# Patient Record
Sex: MALE | Race: WHITE | NOT HISPANIC OR LATINO
[De-identification: names, ages, dates, MRNs, and addresses within clinical notes are randomized per-mention and may not be internally consistent; named-entity substitution may affect disease eponyms.]

---

## 2020-05-26 ENCOUNTER — APPOINTMENT (OUTPATIENT)
Dept: ENDOCRINOLOGY | Facility: CLINIC | Age: 55
End: 2020-05-26
Payer: COMMERCIAL

## 2020-05-26 VITALS
DIASTOLIC BLOOD PRESSURE: 82 MMHG | BODY MASS INDEX: 41.75 KG/M2 | HEART RATE: 77 BPM | SYSTOLIC BLOOD PRESSURE: 136 MMHG | OXYGEN SATURATION: 98 % | HEIGHT: 73 IN | RESPIRATION RATE: 22 BRPM | WEIGHT: 315 LBS

## 2020-05-26 DIAGNOSIS — Z78.9 OTHER SPECIFIED HEALTH STATUS: ICD-10-CM

## 2020-05-26 DIAGNOSIS — Z83.49 FAMILY HISTORY OF OTHER ENDOCRINE, NUTRITIONAL AND METABOLIC DISEASES: ICD-10-CM

## 2020-05-26 DIAGNOSIS — Z80.42 FAMILY HISTORY OF MALIGNANT NEOPLASM OF PROSTATE: ICD-10-CM

## 2020-05-26 PROBLEM — Z00.00 ENCOUNTER FOR PREVENTIVE HEALTH EXAMINATION: Status: ACTIVE | Noted: 2020-05-26

## 2020-05-26 PROCEDURE — 99204 OFFICE O/P NEW MOD 45 MIN: CPT

## 2020-05-26 RX ORDER — VALSARTAN 160 MG/1
160 TABLET, COATED ORAL
Qty: 30 | Refills: 0 | Status: ACTIVE | COMMUNITY
Start: 2020-04-16

## 2020-05-26 RX ORDER — ATORVASTATIN CALCIUM 10 MG/1
10 TABLET, FILM COATED ORAL
Qty: 90 | Refills: 0 | Status: ACTIVE | COMMUNITY
Start: 2020-04-16

## 2020-05-26 RX ORDER — HYDROCHLOROTHIAZIDE 25 MG/1
25 TABLET ORAL
Qty: 30 | Refills: 0 | Status: ACTIVE | COMMUNITY
Start: 2020-04-16

## 2020-05-26 NOTE — PHYSICAL EXAM
[Well Nourished] : well nourished [Alert] : alert [No Acute Distress] : no acute distress [Normal Sclera/Conjunctiva] : normal sclera/conjunctiva [Well Developed] : well developed [No Proptosis] : no proptosis [EOMI] : extra ocular movement intact [Thyroid Not Enlarged] : the thyroid was not enlarged [Normal Oropharynx] : the oropharynx was normal [No Thyroid Nodules] : no palpable thyroid nodules [No Respiratory Distress] : no respiratory distress [No Accessory Muscle Use] : no accessory muscle use [Clear to Auscultation] : lungs were clear to auscultation bilaterally [Normal S1, S2] : normal S1 and S2 [Normal Rate] : heart rate was normal [Regular Rhythm] : with a regular rhythm [Pedal Pulses Normal] : the pedal pulses are present [Not Tender] : non-tender [Normal Bowel Sounds] : normal bowel sounds [Not Distended] : not distended [Soft] : abdomen soft [Normal Anterior Cervical Nodes] : no anterior cervical lymphadenopathy [Normal Posterior Cervical Nodes] : no posterior cervical lymphadenopathy [Spine Straight] : spine straight [No Stigmata of Cushings Syndrome] : no stigmata of Cushings Syndrome [Normal Gait] : normal gait [No Rash] : no rash [Normal Strength/Tone] : muscle strength and tone were normal [Normal Reflexes] : deep tendon reflexes were 2+ and symmetric [No Tremors] : no tremors [Oriented x3] : oriented to person, place, and time [Acanthosis Nigricans] : no acanthosis nigricans [de-identified] : pitting edema B/L

## 2020-05-26 NOTE — ASSESSMENT
[Carbohydrate Consistent Diet] : carbohydrate consistent diet [Importance of Diet and Exercise] : importance of diet and exercise to improve glycemic control, achieve weight loss and improve cardiovascular health [FreeTextEntry1] : Hyperglycemia: check A1c \par \par Erectile dysfunction / testicular hypogonadism \par check testosterone , FSH . LH, SHBG \par check prolactin \par \par HTN :  bp at target on meds. Continue current management.\par I advised low fat/low cholesterol diet, low salt diet, and weight loss\par \par HLD: LDL at target. continue statin \par low fat/low cholesterol diet and weight loss advised\par \par \par Abnormal TFts  check TFts and TPO ab and Tg ab \par he has a brother with hypothyroidism \par \par Morbid obesity : extensive discussion about diet , he cooks at home. Advised to cut down on carbs , start diet 1200 ericka/day. Discussed implications of obesity and testo production and KEILY. \par he has KEILY and si treated with CPAP. \par discussed bariatric surgery but he " wants to eat" and does not want to do it \par discussed weight loss medication and eh will check with insurance. \par

## 2020-09-28 ENCOUNTER — APPOINTMENT (OUTPATIENT)
Dept: ENDOCRINOLOGY | Facility: CLINIC | Age: 55
End: 2020-09-28
Payer: COMMERCIAL

## 2020-09-28 VITALS
BODY MASS INDEX: 41.75 KG/M2 | SYSTOLIC BLOOD PRESSURE: 120 MMHG | WEIGHT: 315 LBS | DIASTOLIC BLOOD PRESSURE: 74 MMHG | HEIGHT: 73 IN

## 2020-09-28 VITALS — TEMPERATURE: 97.1 F

## 2020-09-28 DIAGNOSIS — E66.01 MORBID (SEVERE) OBESITY DUE TO EXCESS CALORIES: ICD-10-CM

## 2020-09-28 DIAGNOSIS — E78.5 HYPERLIPIDEMIA, UNSPECIFIED: ICD-10-CM

## 2020-09-28 DIAGNOSIS — I10 ESSENTIAL (PRIMARY) HYPERTENSION: ICD-10-CM

## 2020-09-28 PROCEDURE — 99214 OFFICE O/P EST MOD 30 MIN: CPT

## 2020-09-28 RX ORDER — TOBRAMYCIN AND DEXAMETHASONE 3; 1 MG/ML; MG/ML
0.3-0.1 SUSPENSION/ DROPS OPHTHALMIC
Qty: 5 | Refills: 0 | Status: DISCONTINUED | COMMUNITY
Start: 2020-02-18 | End: 2020-09-28

## 2020-09-28 RX ORDER — SODIUM SULFATE, POTASSIUM SULFATE, MAGNESIUM SULFATE 17.5; 3.13; 1.6 G/ML; G/ML; G/ML
17.5-3.13-1.6 SOLUTION, CONCENTRATE ORAL
Qty: 354 | Refills: 0 | Status: DISCONTINUED | COMMUNITY
Start: 2020-05-04 | End: 2020-09-28

## 2020-09-28 NOTE — REASON FOR VISIT
[Follow - Up] : a follow-up visit [Weight Management/Obesity] : weight management/obesity [Hypogonadism] : hypogonadism

## 2020-09-28 NOTE — ASSESSMENT
[FreeTextEntry1] : Prediabetes : a1c 5.8. \par discussed diet and exercise\par he needs to loose weight. \par encouraged more exercise walking 30 min 3 x week\par Discussed complications of diabetes at length including MI/CVA/ESRD/dialysis/blindness/amputations/infections\par Needs to try to have more protein for meals\par Importance of blood glucose monitoring discussed. Targets reviewed. Recommended pt try to keep blood glucose level below 130 (110) before meals and below 160 (140) two hours after meals.\par \par Erectile dysfunction / testicular hypogonadism : testosterone low normal. T 282. \par check prolactin \par \par HTN :  bp at target on meds. Continue current management.\par I advised low fat/low cholesterol diet, low salt diet, and weight loss\par \par HLD: continue statin \par low fat/low cholesterol diet and weight loss advised\par \par Abnormal TFts : pt euthyroid clinically and biochemically.\par TPO ab negative. \par \par Morbid obesity : start diet 1200 ericka/day. \par he has KEILY and si treated with CPAP. \par declines bariatric surgery \par discussed weight loss medication and he was supposed to  check with insurance. But he did not.\par  [Importance of Diet and Exercise] : importance of diet and exercise to improve glycemic control, achieve weight loss and improve cardiovascular health [Exercise/Effect on Glucose] : exercise/effect on glucose [Weight Loss] : weight loss

## 2020-09-28 NOTE — PHYSICAL EXAM
[Alert] : alert [Well Nourished] : well nourished [No Acute Distress] : no acute distress [Well Developed] : well developed [Normal Sclera/Conjunctiva] : normal sclera/conjunctiva [EOMI] : extra ocular movement intact [No Proptosis] : no proptosis [Normal Oropharynx] : the oropharynx was normal [Thyroid Not Enlarged] : the thyroid was not enlarged [No Thyroid Nodules] : no palpable thyroid nodules [No Respiratory Distress] : no respiratory distress [No Accessory Muscle Use] : no accessory muscle use [Clear to Auscultation] : lungs were clear to auscultation bilaterally [Normal S1, S2] : normal S1 and S2 [Normal Rate] : heart rate was normal [Regular Rhythm] : with a regular rhythm [No Edema] : no peripheral edema [Pedal Pulses Normal] : the pedal pulses are present [Normal Bowel Sounds] : normal bowel sounds [Not Tender] : non-tender [Not Distended] : not distended [Soft] : abdomen soft [Normal Anterior Cervical Nodes] : no anterior cervical lymphadenopathy [Normal Posterior Cervical Nodes] : no posterior cervical lymphadenopathy [Spine Straight] : spine straight [Normal Gait] : normal gait [No Rash] : no rash [No Tremors] : no tremors [Oriented x3] : oriented to person, place, and time [Acanthosis Nigricans] : no acanthosis nigricans

## 2021-02-10 ENCOUNTER — APPOINTMENT (OUTPATIENT)
Dept: ENDOCRINOLOGY | Facility: CLINIC | Age: 56
End: 2021-02-10

## 2021-10-06 PROBLEM — I10 ESSENTIAL HYPERTENSION: Status: ACTIVE | Noted: 2020-05-26

## 2023-03-15 ENCOUNTER — APPOINTMENT (OUTPATIENT)
Dept: RHEUMATOLOGY | Facility: CLINIC | Age: 58
End: 2023-03-15
Payer: COMMERCIAL

## 2023-03-15 ENCOUNTER — NON-APPOINTMENT (OUTPATIENT)
Age: 58
End: 2023-03-15

## 2023-03-15 VITALS
DIASTOLIC BLOOD PRESSURE: 70 MMHG | BODY MASS INDEX: 41.75 KG/M2 | HEIGHT: 73 IN | WEIGHT: 315 LBS | TEMPERATURE: 97.6 F | SYSTOLIC BLOOD PRESSURE: 150 MMHG | HEART RATE: 81 BPM | OXYGEN SATURATION: 97 %

## 2023-03-15 DIAGNOSIS — E88.81 METABOLIC SYNDROME: ICD-10-CM

## 2023-03-15 PROCEDURE — 99204 OFFICE O/P NEW MOD 45 MIN: CPT | Mod: 25

## 2023-03-15 NOTE — ASSESSMENT
[FreeTextEntry1] : 56 y/o male referred to rheumatology for severe gout.\par Pt started to have gout attacks x 2 in R 1st toe since 2022. Pt also started to have significant chronic bottom of b/l feet pain since then. Pt was started on Mitigare 0.6mg daily in 11/2022 without improvement in the gout pains. Pt was treated with prednisone during prior flares.\par Pt reports drinking EtOH as a trigger. Denies major red meat or shellfish consumption.\par \par Patient has episodes of acute gout and possible chronic gout of b/l feet with high uric acid (8.5 in 1/2023) likely secondary to metabolic syndrome (HTN, HLD, obesity, fatty liver) and diet (EtOH).\par Pt needs long term ULT to prevent gout attacks and decrease cardiovascular risk.\par \par - Obtain labwork to evaluate gout\par - Obtain XR b/l feet to evaluate for gouty changes\par - Rx allopurinol 100mg -> 300mg/day. Side effects of allopurinol were discussed with the pt in detail including risk of acute gout flare on initiation, nausea, diarrhea, rash, abnormal LFTs.\par Dose adjust if GFR<=30. Avoid with AZA or 6-MP.\par - c/w mitigare 0.6mg PO daily for now. Will discontinue when pt's uric acid is well controlled\par - Pt can call for gout flares, short courses of NSAIDs vs. PDN for acute flares\par - Pt's HCTZ can increase uric acid, advised to discuss with PCP about possible switch to a different BP med if possible. Consider use of amlodipine, losartan if indicated as they have moderate urate lowering effects.\par - Advised to avoid excessive alcohol, shellfish, red meat, fructose consumption which can increase serum urate levels and trigger gout attacks. Advised on weight loss. Advised on increased consumption of low-fat dairy products, tart cherry juice which can decrease serum urate levels. Close evaluation for and treatment of hypertension, dyslipidemia, coronary artery disease, diabetes, as gout is associated with metabolic syndrome.\par - RTC 2 months for follow up\par \par \par

## 2023-03-15 NOTE — HISTORY OF PRESENT ILLNESS
[FreeTextEntry1] : 58 y/o male referred to rheumatology for severe gout.\par Pt started to have gout attacks x 2 in R 1st toe since 2022. Pt also started to have significant chronic bottom of b/l feet pain since then. Pt was started on Mitigare 0.6mg daily in 11/2022 without improvement in the gout pains. Pt was treated with prednisone during prior flares.\par Pt reports drinking EtOH as a trigger. Denies major red meat or shellfish consumption.\par

## 2023-03-16 LAB
ALBUMIN SERPL ELPH-MCNC: 4.5 G/DL
ALP BLD-CCNC: 104 U/L
ALT SERPL-CCNC: 51 U/L
ANION GAP SERPL CALC-SCNC: 15 MMOL/L
AST SERPL-CCNC: 34 U/L
BASOPHILS # BLD AUTO: 0.03 K/UL
BASOPHILS NFR BLD AUTO: 0.3 %
BILIRUB SERPL-MCNC: 0.5 MG/DL
BUN SERPL-MCNC: 11 MG/DL
CALCIUM SERPL-MCNC: 9.6 MG/DL
CHLORIDE SERPL-SCNC: 100 MMOL/L
CO2 SERPL-SCNC: 26 MMOL/L
CREAT SERPL-MCNC: 0.83 MG/DL
CRP SERPL-MCNC: 7 MG/L
EGFR: 101 ML/MIN/1.73M2
EOSINOPHIL # BLD AUTO: 0.28 K/UL
EOSINOPHIL NFR BLD AUTO: 2.9 %
ERYTHROCYTE [SEDIMENTATION RATE] IN BLOOD BY WESTERGREN METHOD: 46 MM/HR
GLUCOSE SERPL-MCNC: 115 MG/DL
HCT VFR BLD CALC: 45.5 %
HGB BLD-MCNC: 15.2 G/DL
IMM GRANULOCYTES NFR BLD AUTO: 0.2 %
LYMPHOCYTES # BLD AUTO: 3.18 K/UL
LYMPHOCYTES NFR BLD AUTO: 33.4 %
MAN DIFF?: NORMAL
MCHC RBC-ENTMCNC: 31.9 PG
MCHC RBC-ENTMCNC: 33.4 GM/DL
MCV RBC AUTO: 95.4 FL
MONOCYTES # BLD AUTO: 0.81 K/UL
MONOCYTES NFR BLD AUTO: 8.5 %
NEUTROPHILS # BLD AUTO: 5.2 K/UL
NEUTROPHILS NFR BLD AUTO: 54.7 %
PLATELET # BLD AUTO: 329 K/UL
POTASSIUM SERPL-SCNC: 4.9 MMOL/L
PROT SERPL-MCNC: 7.6 G/DL
RBC # BLD: 4.77 M/UL
RBC # FLD: 13.7 %
SODIUM SERPL-SCNC: 140 MMOL/L
URATE SERPL-MCNC: 7 MG/DL
WBC # FLD AUTO: 9.52 K/UL

## 2023-03-19 LAB — G6PD SER-CCNC: 14.3 U/G HGB

## 2023-04-19 ENCOUNTER — RESULT REVIEW (OUTPATIENT)
Age: 58
End: 2023-04-19

## 2023-04-19 ENCOUNTER — APPOINTMENT (OUTPATIENT)
Dept: RADIOLOGY | Facility: CLINIC | Age: 58
End: 2023-04-19
Payer: COMMERCIAL

## 2023-04-19 ENCOUNTER — OUTPATIENT (OUTPATIENT)
Dept: OUTPATIENT SERVICES | Facility: HOSPITAL | Age: 58
LOS: 1 days | End: 2023-04-19
Payer: COMMERCIAL

## 2023-04-19 DIAGNOSIS — M10.9 GOUT, UNSPECIFIED: ICD-10-CM

## 2023-04-19 PROCEDURE — 73630 X-RAY EXAM OF FOOT: CPT

## 2023-04-19 PROCEDURE — 73630 X-RAY EXAM OF FOOT: CPT | Mod: 26,50

## 2023-04-20 ENCOUNTER — APPOINTMENT (OUTPATIENT)
Dept: ORTHOPEDIC SURGERY | Facility: CLINIC | Age: 58
End: 2023-04-20
Payer: COMMERCIAL

## 2023-04-20 VITALS — BODY MASS INDEX: 41.75 KG/M2 | WEIGHT: 315 LBS | HEIGHT: 73 IN

## 2023-04-20 DIAGNOSIS — I10 ESSENTIAL (PRIMARY) HYPERTENSION: ICD-10-CM

## 2023-04-20 PROCEDURE — 73502 X-RAY EXAM HIP UNI 2-3 VIEWS: CPT

## 2023-04-20 PROCEDURE — 99203 OFFICE O/P NEW LOW 30 MIN: CPT

## 2023-04-20 NOTE — HISTORY OF PRESENT ILLNESS
[Gradual] : gradual [10] : 10 [0] : 0 [Localized] : localized [Sharp] : sharp [Tingling] : tingling [Rest] : rest [de-identified] : 57M p/w L hip pain. Started after a basketball injury 15 years ago but pain has worsened steadily. He complains of anterior hip and groin pain mostly. He has tried ibuprofen without much relief. He has not tried PT yet. [] : no [FreeTextEntry1] : Left Hip  [FreeTextEntry3] : 15 years ago  [FreeTextEntry5] : pt states no specific injury has occurred  [de-identified] : activity

## 2023-04-20 NOTE — ASSESSMENT
[FreeTextEntry1] : 57M p/w adv L hip OA\par \par Discussed AZALIA and its recovery, current BMI is 46, discussed posterior approach vs anterior, he would like to try to lose weight for anterior surgery, return 2-3 mo

## 2023-04-20 NOTE — PHYSICAL EXAM
[2+] : dorsalis pedis pulse: 2+ [Left] : left hip with pelvis [Severe arthritis (Tonnis Grade 3)] : Severe arthritis (Tonnis Grade 3) [] : no greater trochanteric tenderness

## 2023-05-17 ENCOUNTER — APPOINTMENT (OUTPATIENT)
Dept: RHEUMATOLOGY | Facility: CLINIC | Age: 58
End: 2023-05-17
Payer: COMMERCIAL

## 2023-05-17 PROCEDURE — 99214 OFFICE O/P EST MOD 30 MIN: CPT | Mod: 25

## 2023-05-17 PROCEDURE — 36415 COLL VENOUS BLD VENIPUNCTURE: CPT

## 2023-05-17 NOTE — ASSESSMENT
[FreeTextEntry1] : 56 y/o male presents as follow up for severe gout. \par Pt started to have gout attacks x 2 in R 1st toe since 2022. Pt also started to have significant chronic bottom of b/l feet pain since then. Pt was started on Mitigare 0.6mg daily in 11/2022 without improvement in the gout pains. Pt was treated with prednisone during prior flares. \par Pt reports drinking EtOH as a trigger. Denies major red meat or shellfish consumption. \par \par Patient has episodes of acute gout and possible chronic gout of b/l feet with high uric acid (8.5 in 1/2023) likely secondary to metabolic syndrome (HTN, HLD, obesity, fatty liver) and diet (EtOH). \par Pt needs long term ULT to prevent gout attacks and decrease cardiovascular risk. \par \par Pt was started on allopurinol with titration up without any changes in his chronic feet pain. Pt has stopped Mitigare since last visit with no increase in his feet pain. Pt should continued to be treated for gout while evaluating for other causes of feet pain.\par \par - Obtain labwork to evaluate gout \par - Obtain MR L foot with contrast to evaluate for causes of chronic severe feet pain not improved with gout treatment. \par - c/w allopurinol 300mg/day. Side effects of allopurinol were discussed with the pt in detail including risk of acute gout flare on initiation, nausea, diarrhea, rash, abnormal LFTs. \par Dose adjust if GFR<=30. Avoid with AZA or 6-MP. \par - Pt can call for gout flares, short courses of NSAIDs vs. PDN for acute flares \par - Pt's HCTZ can increase uric acid, advised to discuss with PCP about possible switch to a different BP med if possible. Consider use of amlodipine, losartan if indicated as they have moderate urate lowering effects. \par - Advised to avoid excessive alcohol, shellfish, red meat, fructose consumption which can increase serum urate levels and trigger gout attacks. Advised on weight loss. Advised on increased consumption of low-fat dairy products, tart cherry juice which can decrease serum urate levels. Close evaluation for and treatment of hypertension, dyslipidemia, coronary artery disease, diabetes, as gout is associated with metabolic syndrome. \par - Will call pt with uric acid results and MR results for any changes in therapy. RTC 3 months for follow up \par

## 2023-05-17 NOTE — HISTORY OF PRESENT ILLNESS
[FreeTextEntry1] : HISTORY: \par 56 y/o male presents as follow up for severe gout. \par Pt started to have gout attacks x 2 in R 1st toe since 2022. Pt also started to have significant chronic bottom of b/l feet pain since then. Pt was started on Mitigare 0.6mg daily in 11/2022 without improvement in the gout pains. Pt was treated with prednisone during prior flares. \par Pt reports drinking EtOH as a trigger. Denies major red meat or shellfish consumption. \par \par Patient has episodes of acute gout and possible chronic gout of b/l feet with high uric acid (8.5 in 1/2023) likely secondary to metabolic syndrome (HTN, HLD, obesity, fatty liver) and diet (EtOH). \par Pt needs long term ULT to prevent gout attacks and decrease cardiovascular risk. \par \par INTERVAL HISTORY: \par Since last visit, pt's allopurinol was increased to 300mg/day. Pt reports no differences in pain of feet.\par Pt has stopped Mitigare since last visit with no increase in his feet pain.\par \par WORKUP: \par Uric acid: 7.0 (3/2023) \par Remarkable for (3/2023): ESR 46, CRP 7, ALT 51 \par Normal/neg (3/2023): CBC, CMP (except ALT), G6PD level \par XR b/l feet (3/2023): No gouty erosions seen. Minimal plantar calcaneal enthesophyte.

## 2023-05-18 LAB
ALBUMIN SERPL ELPH-MCNC: 4.5 G/DL
ALP BLD-CCNC: 94 U/L
ALT SERPL-CCNC: 34 U/L
ANION GAP SERPL CALC-SCNC: 17 MMOL/L
AST SERPL-CCNC: 29 U/L
BASOPHILS # BLD AUTO: 0.03 K/UL
BASOPHILS NFR BLD AUTO: 0.3 %
BILIRUB SERPL-MCNC: 0.6 MG/DL
BUN SERPL-MCNC: 19 MG/DL
CALCIUM SERPL-MCNC: 10.4 MG/DL
CHLORIDE SERPL-SCNC: 97 MMOL/L
CO2 SERPL-SCNC: 24 MMOL/L
CREAT SERPL-MCNC: 0.86 MG/DL
CRP SERPL-MCNC: 17 MG/L
EGFR: 100 ML/MIN/1.73M2
EOSINOPHIL # BLD AUTO: 0.27 K/UL
EOSINOPHIL NFR BLD AUTO: 3 %
ERYTHROCYTE [SEDIMENTATION RATE] IN BLOOD BY WESTERGREN METHOD: 38 MM/HR
GLUCOSE SERPL-MCNC: 124 MG/DL
HCT VFR BLD CALC: 44.3 %
HGB BLD-MCNC: 14.7 G/DL
IMM GRANULOCYTES NFR BLD AUTO: 0.3 %
LYMPHOCYTES # BLD AUTO: 2.43 K/UL
LYMPHOCYTES NFR BLD AUTO: 27.1 %
MAN DIFF?: NORMAL
MCHC RBC-ENTMCNC: 32.4 PG
MCHC RBC-ENTMCNC: 33.2 GM/DL
MCV RBC AUTO: 97.6 FL
MONOCYTES # BLD AUTO: 0.79 K/UL
MONOCYTES NFR BLD AUTO: 8.8 %
NEUTROPHILS # BLD AUTO: 5.42 K/UL
NEUTROPHILS NFR BLD AUTO: 60.5 %
PLATELET # BLD AUTO: 303 K/UL
POTASSIUM SERPL-SCNC: 4.3 MMOL/L
PROT SERPL-MCNC: 7.3 G/DL
RBC # BLD: 4.54 M/UL
RBC # FLD: 14.5 %
SODIUM SERPL-SCNC: 138 MMOL/L
URATE SERPL-MCNC: 6.1 MG/DL
WBC # FLD AUTO: 8.97 K/UL

## 2023-05-30 ENCOUNTER — APPOINTMENT (OUTPATIENT)
Dept: MRI IMAGING | Facility: CLINIC | Age: 58
End: 2023-05-30
Payer: COMMERCIAL

## 2023-05-30 ENCOUNTER — OUTPATIENT (OUTPATIENT)
Dept: OUTPATIENT SERVICES | Facility: HOSPITAL | Age: 58
LOS: 1 days | End: 2023-05-30

## 2023-05-30 DIAGNOSIS — M10.9 GOUT, UNSPECIFIED: ICD-10-CM

## 2023-05-30 PROCEDURE — 73720 MRI LWR EXTREMITY W/O&W/DYE: CPT | Mod: 26

## 2023-05-31 DIAGNOSIS — M79.672 PAIN IN RIGHT FOOT: ICD-10-CM

## 2023-05-31 DIAGNOSIS — G89.29 PAIN IN RIGHT FOOT: ICD-10-CM

## 2023-05-31 DIAGNOSIS — M79.671 PAIN IN RIGHT FOOT: ICD-10-CM

## 2023-08-14 ENCOUNTER — APPOINTMENT (OUTPATIENT)
Dept: RHEUMATOLOGY | Facility: CLINIC | Age: 58
End: 2023-08-14
Payer: COMMERCIAL

## 2023-08-14 VITALS
HEIGHT: 73 IN | HEART RATE: 78 BPM | WEIGHT: 315 LBS | BODY MASS INDEX: 41.75 KG/M2 | OXYGEN SATURATION: 97 % | SYSTOLIC BLOOD PRESSURE: 169 MMHG | TEMPERATURE: 97.5 F | DIASTOLIC BLOOD PRESSURE: 104 MMHG

## 2023-08-14 DIAGNOSIS — M16.12 UNILATERAL PRIMARY OSTEOARTHRITIS, LEFT HIP: ICD-10-CM

## 2023-08-14 DIAGNOSIS — Z79.899 OTHER LONG TERM (CURRENT) DRUG THERAPY: ICD-10-CM

## 2023-08-14 DIAGNOSIS — M10.9 GOUT, UNSPECIFIED: ICD-10-CM

## 2023-08-14 PROCEDURE — 99214 OFFICE O/P EST MOD 30 MIN: CPT | Mod: 25

## 2023-08-14 PROCEDURE — 36415 COLL VENOUS BLD VENIPUNCTURE: CPT

## 2023-08-14 RX ORDER — ALLOPURINOL 100 MG/1
100 TABLET ORAL
Qty: 90 | Refills: 1 | Status: ACTIVE | COMMUNITY
Start: 2023-03-15 | End: 1900-01-01

## 2023-08-15 LAB
ALBUMIN SERPL ELPH-MCNC: 4.9 G/DL
ALP BLD-CCNC: 107 U/L
ALT SERPL-CCNC: 53 U/L
ANION GAP SERPL CALC-SCNC: 17 MMOL/L
AST SERPL-CCNC: 41 U/L
BILIRUB SERPL-MCNC: 0.4 MG/DL
BUN SERPL-MCNC: 9 MG/DL
CALCIUM SERPL-MCNC: 10.1 MG/DL
CHLORIDE SERPL-SCNC: 90 MMOL/L
CO2 SERPL-SCNC: 24 MMOL/L
CREAT SERPL-MCNC: 0.82 MG/DL
EGFR: 102 ML/MIN/1.73M2
GLUCOSE SERPL-MCNC: 118 MG/DL
POTASSIUM SERPL-SCNC: 4.7 MMOL/L
PROT SERPL-MCNC: 7.7 G/DL
SODIUM SERPL-SCNC: 131 MMOL/L
URATE SERPL-MCNC: 4.2 MG/DL

## 2023-08-15 NOTE — PHYSICAL EXAM
[TextEntry] : GENERAL: Appears in no acute distress HEENT: EOMI, PERRLA. No conjunctival erythema. Moist mucous membranes. No nasopharyngeal ulcers NECK: Supple, no cervical lymphadenopathy, no thyromegaly CARDIOVASCULAR: RRR. S1, S2 auscultated. No murmurs or rubs. PULMONARY: Clear to auscultation b/l, no wheezes, rales, or crackles ABDOMINAL: Soft, nontender, nondistended. Bowel sounds present. No organomegaly. MSK: No active synovitis, swelling, erythema, or warmth. No deformities. Normal ROM of neck, back, b/l upper and lower extremities. SKIN: No lesions or rashes. No tophi visible or palpable NEURO: No focal deficits. PSYCH: AAOx3. Normal affect and thought process.

## 2023-08-15 NOTE — ASSESSMENT
[FreeTextEntry1] : 58 y/o male presents as follow up for gout and feet pain.  Pt started to have gout attacks x 2 in R 1st toe since 2022. Pt also started to have significant chronic bottom of b/l feet pain since then. Pt was started on Mitigare 0.6mg daily in 11/2022 without improvement in the gout pains. Pt was treated with prednisone during prior flares.  Pt reports drinking EtOH as a trigger. Denies major red meat or shellfish consumption.   Patient has episodes of acute gout and possible chronic gout of b/l feet with high uric acid (8.5 in 1/2023) likely secondary to metabolic syndrome (HTN, HLD, obesity, fatty liver) and diet (EtOH).  Pt needs long term ULT to prevent gout attacks and decrease cardiovascular risk. Pt was started on allopurinol with titration up with some improvement in his chronic feet pain. Pt has stopped Mitigare with no increase in his feet pain. MR L foot by me showed focus of edema/enhancement of intrinsic muscle of foot dorsal to 3rd metatarsal. Pt has not been seen by podiatry yet.  Pt has been having worsening L hip OA and is planning on surgery after some weight loss. Pt reports b/l ankle swelling that has been more prominent as well.  - Obtain labwork to evaluate gout  - Refer to podiatry for evaluation of MR foot finding for other causes of pt's feet pains. - c/w allopurinol 400mg/day. Side effects of allopurinol were discussed with the pt in detail including risk of acute gout flare on initiation, nausea, diarrhea, rash, abnormal LFTs.  Dose adjust if GFR<=30. Avoid with AZA or 6-MP.  - Pt can call for gout flares, short courses of NSAIDs vs. PDN for acute flares  - Advised to avoid excessive alcohol, shellfish, red meat, fructose consumption which can increase serum urate levels and trigger gout attacks. Advised on weight loss. Advised on increased consumption of low-fat dairy products, tart cherry juice which can decrease serum urate levels. Close evaluation for and treatment of hypertension, dyslipidemia, coronary artery disease, diabetes, as gout is associated with metabolic syndrome.  - Will call pt with uric acid results for any changes in therapy. RTC 3 months for follow up   ADDENDUM: Uric acid is 4.2, no change in allopurinol dosing is needed at this time.

## 2023-08-15 NOTE — HISTORY OF PRESENT ILLNESS
[FreeTextEntry1] : HISTORY:  58 y/o male presents as follow up for gout and feet pain.  Pt started to have gout attacks x 2 in R 1st toe since 2022. Pt also started to have significant chronic bottom of b/l feet pain since then. Pt was started on Mitigare 0.6mg daily in 11/2022 without improvement in the gout pains. Pt was treated with prednisone during prior flares.  Pt reports drinking EtOH as a trigger. Denies major red meat or shellfish consumption.   Patient has episodes of acute gout and possible chronic gout of b/l feet with high uric acid (8.5 in 1/2023) likely secondary to metabolic syndrome (HTN, HLD, obesity, fatty liver) and diet (EtOH).  Pt needs long term ULT to prevent gout attacks and decrease cardiovascular risk. Pt was started on allopurinol with titration up without any changes in his chronic feet pain. Pt has stopped Mitigarewith no increase in his feet pain. Pt should continued to be treated for gout while evaluating for other causes of feet pain.   INTERVAL HISTORY:  Pt has been on allopurinol 400mg/day with some improvement in his feet pain. Uric acid is now down to 6.1 (on allopurinol 300mg/day dosing) MR L foot by me showed focus of edema/enhancement of intrinsic muscle of foot dorsal to 3rd metatarsal. Pt has not been seen by podiatry yet. Pt has been having worsening L hip OA and is planning on surgery after some weight loss. Pt reports b/l ankle swelling that has been more prominent as well.  WORKUP:  Uric acid: 7.0 (3/2023) -> 6.1 (5/2023)  Remarkable for (3/2023): ESR 46, CRP 7, ALT 51  Normal/neg (3/2023): CBC, CMP (except ALT), G6PD level  XR b/l feet (3/2023): No gouty erosions seen. Minimal plantar calcaneal enthesophyte.  MR L foot (5/2023): No signs of tophaceous gout or erosions. Edema and enhancement around lobulated focus of intrinsic muscle of foot dorsal to the 3rd metatarsal.

## 2023-11-17 ENCOUNTER — APPOINTMENT (OUTPATIENT)
Dept: RHEUMATOLOGY | Facility: CLINIC | Age: 58
End: 2023-11-17

## 2024-03-16 ENCOUNTER — APPOINTMENT (OUTPATIENT)
Dept: ORTHOPEDIC SURGERY | Facility: CLINIC | Age: 59
End: 2024-03-16
Payer: COMMERCIAL

## 2024-03-16 ENCOUNTER — NON-APPOINTMENT (OUTPATIENT)
Age: 59
End: 2024-03-16

## 2024-03-16 VITALS
HEART RATE: 96 BPM | SYSTOLIC BLOOD PRESSURE: 141 MMHG | HEIGHT: 73 IN | DIASTOLIC BLOOD PRESSURE: 83 MMHG | BODY MASS INDEX: 41.75 KG/M2 | WEIGHT: 315 LBS

## 2024-03-16 PROCEDURE — 73502 X-RAY EXAM HIP UNI 2-3 VIEWS: CPT

## 2024-03-16 PROCEDURE — 99204 OFFICE O/P NEW MOD 45 MIN: CPT

## 2024-03-16 NOTE — DISCUSSION/SUMMARY
[Medication Risks Reviewed] : Medication risks reviewed [Surgical risks reviewed] : Surgical risks reviewed [de-identified] : Left hip osteoarthritis Left hip pain  Treatment options were reviewed with the patient is end-stage arthritis of his left hip failure of conservative treatment risks benefits alternatives to left anterior total hip arthroplasty reviewed the patient as well as the preoperative operative and postoperative course.  Patient will require cardiology vascular and medical optimization prior to surgery.  I have  seen and evaluated this patient and is guiding this patients entire orthopedic management plan. The patient was advised that based upon their clinical presentation and radiographic findings they meet inclusion criteria for benefitting from an Anterior approach(ASI) total hip arthroplasty as a treatment option for severe arthritis of their Left hip. The spectrum of treatments for severe hip DJD were reviewed in detail. I reviewed in detail the goals, alternatives, risks and benefits of ASI total hip arthroplasty.  The patient was advised of the possible complications which are inclusive of but not exclusive to VTE-related, infectious-related, anesthetic-related, surgically-related, Lateral Femoral Cutaneous nerve injury-related, medically-related, and implant-related. The patient was advised that limb length equality may not be assured secondary to variabilities in pelvic malrotation, stable intraoperative fit, opposite side wear, and other anatomic deformities of the lower extremity. The patient was advised of the goals, alternatives, risks and benefits of autologous, directed and banked blood product transfusions for perioperative blood losses. A non-cemented type hip implant is utilized in consideration of bony integrity intraoperatively.  The patient was educated regarding the surgical procedure steps, especially using an Hip implant and bone model, as well as steps in their hospital course and primary discharge planning for home discharge with home care and home PT. Choices for implant 's, mainly DJO and Biomet-Connie were reviewed, with selection to be made by surgeon intraoperatively. The patient was advised of the goals, alternatives, risks and benefits of a 3 week course of Celebrex 200 mg BID utilized by Dr. Bauer in their practice to prevent Heterotopic Ossification seen in patients post total hip arthroplasty. If this is medically contraindicated the alternative would be a single dose (800cGy) radiation treatment to the hip implant site performed pre-operatively. A consultation with the Radiation Oncologist at [facility] will then be required. I reviewed the plan of care as well as a model of the Hip implant equivalent to the one that will be used for the THR. The patient agreed to the plan of care as well as the use of implants for their THR. The patient was advised that they will require a medical preoperative risk evaluation by their PCP. Further medical subspecialty clearances such as cardiac may be indicated if felt needed by their PCP. The patient was advised he/she may call our office after careful consideration of their treatment options and further consultation The patient was thoroughly educated using models and the actual implant.  All of the patient's questions were answered to their satisfaction.  The patient would need a bedside commode and a rolling walker for use for activities of daily living status post a total joint replacement .

## 2024-03-16 NOTE — HISTORY OF PRESENT ILLNESS
[de-identified] : 58-year-old male presents complaining of chronic left hip and groin pain.  He notes pain in the groin difficulty ambulating secondary to his pain.  He has been walking with a limp.  He notes his pain is worse with ascending descending stairs bending and squatting to maneuvers long periods of ambulation.  No improvement with anti-inflammatory medication and conservative measures.  He is a retired  and 5 years ago they thought it was his back he underwent back surgery with no improvement and continued to have groin pain radiating down his thigh into his knee.  Recently he was hospitalized for cellulitis in the right lower extremity he did see a vascular doctor who found him to have normal blood flow to his lower extremities.  He denies paresthesias in the lower extremity otherwise has no other orthopedic complaints.  He is otherwise in his normal state of health.

## 2024-03-16 NOTE — PHYSICAL EXAM
[Antalgic] : antalgic [LE] : Sensory: Intact in bilateral lower extremities [Normal] : no peripheral adenopathy appreciated [de-identified] : Examination of the left hip: Inspection: Leg lengths were measured with lying supine on the exam table noted to be symmetric.  The patient was evaluated ambulating and noted to have a Trendelenburg gait. Palpation no significant tenderness to palpation Range of motion: Significant decreased range of motion of the left hip when compared to the right side hip flexion 0-70 internal rotation 0 degrees external rotation 0 to 20 degrees.  This is significant pain noted with range of motion.  [de-identified] : X-rays of the left hip and pelvis show severe degenerative changes of the left hip with bone-on-bone contact periarticular osteophytes and cystic changes.

## 2024-03-18 ENCOUNTER — OUTPATIENT (OUTPATIENT)
Dept: OUTPATIENT SERVICES | Facility: HOSPITAL | Age: 59
LOS: 1 days | End: 2024-03-18
Payer: COMMERCIAL

## 2024-03-18 VITALS
RESPIRATION RATE: 15 BRPM | TEMPERATURE: 98 F | OXYGEN SATURATION: 96 % | HEIGHT: 74 IN | DIASTOLIC BLOOD PRESSURE: 90 MMHG | HEART RATE: 80 BPM | WEIGHT: 311.07 LBS | SYSTOLIC BLOOD PRESSURE: 160 MMHG

## 2024-03-18 DIAGNOSIS — M16.12 UNILATERAL PRIMARY OSTEOARTHRITIS, LEFT HIP: ICD-10-CM

## 2024-03-18 DIAGNOSIS — Z01.818 ENCOUNTER FOR OTHER PREPROCEDURAL EXAMINATION: ICD-10-CM

## 2024-03-18 DIAGNOSIS — Z98.890 OTHER SPECIFIED POSTPROCEDURAL STATES: Chronic | ICD-10-CM

## 2024-03-18 LAB
A1C WITH ESTIMATED AVERAGE GLUCOSE RESULT: 5.5 % — SIGNIFICANT CHANGE UP (ref 4–5.6)
ALBUMIN SERPL ELPH-MCNC: 4.2 G/DL — SIGNIFICANT CHANGE UP (ref 3.3–5)
ALP SERPL-CCNC: 87 U/L — SIGNIFICANT CHANGE UP (ref 30–120)
ALT FLD-CCNC: 50 U/L — SIGNIFICANT CHANGE UP (ref 10–60)
ANION GAP SERPL CALC-SCNC: 9 MMOL/L — SIGNIFICANT CHANGE UP (ref 5–17)
APTT BLD: 30.9 SEC — SIGNIFICANT CHANGE UP (ref 24.5–35.6)
AST SERPL-CCNC: 30 U/L — SIGNIFICANT CHANGE UP (ref 10–40)
BILIRUB SERPL-MCNC: 1.1 MG/DL — SIGNIFICANT CHANGE UP (ref 0.2–1.2)
BLD GP AB SCN SERPL QL: SIGNIFICANT CHANGE UP
BUN SERPL-MCNC: 17 MG/DL — SIGNIFICANT CHANGE UP (ref 7–23)
CALCIUM SERPL-MCNC: 10 MG/DL — SIGNIFICANT CHANGE UP (ref 8.4–10.5)
CHLORIDE SERPL-SCNC: 97 MMOL/L — SIGNIFICANT CHANGE UP (ref 96–108)
CO2 SERPL-SCNC: 28 MMOL/L — SIGNIFICANT CHANGE UP (ref 22–31)
CREAT SERPL-MCNC: 1.04 MG/DL — SIGNIFICANT CHANGE UP (ref 0.5–1.3)
EGFR: 83 ML/MIN/1.73M2 — SIGNIFICANT CHANGE UP
ESTIMATED AVERAGE GLUCOSE: 111 MG/DL — SIGNIFICANT CHANGE UP (ref 68–114)
GLUCOSE SERPL-MCNC: 128 MG/DL — HIGH (ref 70–99)
HCT VFR BLD CALC: 45.6 % — SIGNIFICANT CHANGE UP (ref 39–50)
HGB BLD-MCNC: 16.1 G/DL — SIGNIFICANT CHANGE UP (ref 13–17)
INR BLD: 1.07 RATIO — SIGNIFICANT CHANGE UP (ref 0.85–1.18)
MCHC RBC-ENTMCNC: 32.5 PG — SIGNIFICANT CHANGE UP (ref 27–34)
MCHC RBC-ENTMCNC: 35.3 GM/DL — SIGNIFICANT CHANGE UP (ref 32–36)
MCV RBC AUTO: 92.1 FL — SIGNIFICANT CHANGE UP (ref 80–100)
MRSA PCR RESULT.: SIGNIFICANT CHANGE UP
NRBC # BLD: 0 /100 WBCS — SIGNIFICANT CHANGE UP (ref 0–0)
PLATELET # BLD AUTO: 311 K/UL — SIGNIFICANT CHANGE UP (ref 150–400)
POTASSIUM SERPL-MCNC: 4.3 MMOL/L — SIGNIFICANT CHANGE UP (ref 3.5–5.3)
POTASSIUM SERPL-SCNC: 4.3 MMOL/L — SIGNIFICANT CHANGE UP (ref 3.5–5.3)
PROT SERPL-MCNC: 8.4 G/DL — HIGH (ref 6–8.3)
PROTHROM AB SERPL-ACNC: 12 SEC — SIGNIFICANT CHANGE UP (ref 9.5–13)
RBC # BLD: 4.95 M/UL — SIGNIFICANT CHANGE UP (ref 4.2–5.8)
RBC # FLD: 12.8 % — SIGNIFICANT CHANGE UP (ref 10.3–14.5)
S AUREUS DNA NOSE QL NAA+PROBE: SIGNIFICANT CHANGE UP
SODIUM SERPL-SCNC: 134 MMOL/L — LOW (ref 135–145)
WBC # BLD: 10.14 K/UL — SIGNIFICANT CHANGE UP (ref 3.8–10.5)
WBC # FLD AUTO: 10.14 K/UL — SIGNIFICANT CHANGE UP (ref 3.8–10.5)

## 2024-03-18 PROCEDURE — 93010 ELECTROCARDIOGRAM REPORT: CPT

## 2024-03-18 PROCEDURE — 93005 ELECTROCARDIOGRAM TRACING: CPT

## 2024-03-18 PROCEDURE — G0463: CPT

## 2024-03-18 NOTE — H&P PST ADULT - NSCAFFEINETYPE_GEN_ALL_CORE_SD
Vaccine Information Statement(s) was given today. This has been reviewed, questions answered, and verbal consent given by Parent for injection(s) and administration of Human papillomavirus (HPV) - Gardasil (patient waited the recommended 15 mins after receiving the HPV vaccine), Influenza (Inactivated), Meningococcal  and Tetanus/Diphtheria/Pertussis (Tdap). 1. Does the patient have a moderate to severe fever? No  2. Has the patient had a serious reaction to a flu shot before? No  3. Has the patient ever had [place] Syndrome within 6 weeks of a previous flu shot? No  4. Does the patient have a serious allergy to eggs? No  5. Is the patient less that 10months of age? No    Patient is eligible to receive the vaccine based on all questions being answered as 'No'. Patient tolerated without incident. See immunization grid for documentation. coffee

## 2024-03-18 NOTE — H&P PST ADULT - HEIGHT IN FEET
Department of Anesthesiology  Preprocedure Note       Name:  Brandy Hall   Age:  71 y.o.  :  1958                                          MRN:  [id number]         Date:  3/17/2023      Surgeon: Kurtis Caicedo):  Shon Paris MD    Procedure: Procedure(s):  CABG CORONARY ARTERY BYPASS WITH IMPELLA    Medications prior to admission:   Prior to Admission medications    Medication Sig Start Date End Date Taking? Authorizing Provider   aspirin 81 MG chewable tablet Take 81 mg by mouth daily    Historical Provider, MD   atorvastatin (LIPITOR) 80 MG tablet Take 80 mg by mouth daily    Historical Provider, MD   triamterene-hydroCHLOROthiazide (MAXZIDE-25) 37.5-25 MG per tablet TAKE ONE TABLET BY MOUTH ONE TIME DAILY 22   Mauricio Hopkins MD   allopurinol (ZYLOPRIM) 100 MG tablet Take 1 tablet by mouth in the morning. 22   Mauricio Hopkins MD   cyanocobalamin 1000 MCG tablet Take 1,000 mcg by mouth daily  Patient not taking: Reported on 3/15/2023    Ar Automatic Reconciliation   dapagliflozin (FARXIGA) 10 MG tablet Take 10 mg by mouth daily  Patient not taking: Reported on 3/15/2023 7/26/16   Ar Automatic Reconciliation   fenofibrate (TRIGLIDE) 160 MG tablet Take 160 mg by mouth daily 21   Ar Automatic Reconciliation   insulin aspart (NOVOLOG) 100 UNIT/ML injection vial Inject into the skin as needed Patient take 1:9 carb ratio plus 3 units/50 > 150    Ar Automatic Reconciliation   Insulin Degludec 100 UNIT/ML SOPN Inject 92 Units into the skin nightly 10/30/17   Ar Automatic Reconciliation   levothyroxine (SYNTHROID) 175 MCG tablet Take 175 mcg by mouth every morning (before breakfast) 21   Ar Automatic Reconciliation   lisinopril (PRINIVIL;ZESTRIL) 20 MG tablet Take one tab daily.  21   Ar Automatic Reconciliation   metFORMIN (GLUCOPHAGE) 1000 MG tablet Take 1,000 mg by mouth 2 times daily (with meals) 10/7/21   Ar Automatic Reconciliation   nitroGLYCERIN (NITROSTAT) 0.4 MG SL tablet Use as directed for chest pain    Ar Automatic Reconciliation   omeprazole (PRILOSEC) 20 MG delayed release capsule Take 20 mg by mouth daily  Patient not taking: No sig reported    Ar Automatic Reconciliation   Semaglutide, 1 MG/DOSE, (OZEMPIC, 1 MG/DOSE,) 2 MG/1.5ML SOPN Inject 1 mg into the skin every 7 days 8/11/21   Ar Automatic Reconciliation       Current medications:    No current facility-administered medications for this visit. No current outpatient medications on file.      Facility-Administered Medications Ordered in Other Visits   Medication Dose Route Frequency Provider Last Rate Last Admin    amiodarone (CORDARONE) 150 mg in dextrose 5 % 100 mL bolus  150 mg IntraVENous Once ABHILASH Brewer CNP   Held at 03/17/23 0625    insulin lispro (HUMALOG) injection vial 12 Units  12 Units SubCUTAneous Once Chrissy Deras MD        levothyroxine (SYNTHROID) tablet 175 mcg  175 mcg Oral QAM AC Ophelia Preciado MD   175 mcg at 03/16/23 0457    ceFAZolin (ANCEF) 2000 mg in sterile water 20 mL IV syringe  2,000 mg IntraVENous On Call to [address] [place] [address]        insulin glargine (LANTUS) injection vial 5 Units  5 Units SubCUTAneous Daily ABHILASH Pritchett CNP   5 Units at 03/16/23 2242    insulin lispro (HUMALOG) injection vial 2 Units  2 Units SubCUTAneous TID  ABHILASH Pritchett - CNP        amiodarone (CORDARONE) 450 mg in dextrose 5 % 250 mL infusion ([id number])  1 mg/min IntraVENous Continuous Jg Ingram MD 33.3 mL/hr at 03/17/23 0506 1 mg/min at 03/17/23 0506    metoprolol (LOPRESSOR) injection 5 mg  5 mg IntraVENous Q6H Ophelia Preciado MD   5 mg at 03/16/23 1729    propofol injection  5-50 mcg/kg/min IntraVENous Continuous Heaven Melchor MD   Stopped at 03/16/23 2158    fentaNYL (SUBLIMAZE) 1,000 mcg in sodium chloride 0.9% 100 mL infusion   mcg/hr IntraVENous Continuous Heaven Melchor MD 7.5 mL/hr at 03/17/23 0603 75 mcg/hr at 03/17/23 0603    fentaNYL (SUBLIMAZE) injection 50 mcg  50 mcg IntraVENous Q1H PRN Seng Palmer MD   50 mcg at 03/17/23 0738    midazolam (VERSED) injection 2 mg  2 mg IntraVENous Q1H PRN Seng Palmer MD   2 mg at 03/17/23 0016    norepinephrine (LEVOPHED) 16 mg in sodium chloride 0.9 % 250 mL infusion  1-30 mcg/min IntraVENous Continuous Seng Palmer MD   Stopped at 03/17/23 9435    pantoprazole (PROTONIX) 40 mg in sodium chloride (PF) 0.9 % 10 mL injection  40 mg IntraVENous Daily Seng Palmer MD   40 mg at 03/17/23 0800    DOBUTamine (DOBUTREX) 500 mg in dextrose 5 % 250 mL infusion  2.5-10 mcg/kg/min IntraVENous Continuous ABHILASH Peacock CNP   Stopped at 03/17/23 0359    midazolam (VERSED) 1 mg/mL in NS infusion  1-10 mg/hr IntraVENous Continuous Snehal Shah MD 3 mL/hr at 03/17/23 0016 3 mg/hr at 03/17/23 0016    allopurinol (ZYLOPRIM) tablet 100 mg  100 mg Oral Daily Dalton Jacqueline, APRN - CNP   100 mg at 03/16/23 7946    glucose chewable tablet 16 g  4 tablet Oral PRN Jonathan Jacqueline, APRN - CNP        dextrose bolus 10% 125 mL  125 mL IntraVENous PRN Jonathan Jacqueline, APRN - CNP        Or    dextrose bolus 10% 250 mL  250 mL IntraVENous PRN Jonathan Jacqueline, APRN - CNP        glucagon (rDNA) injection 1 mg  1 mg SubCUTAneous PRN Dalton Jacqueline, APRN - CNP        dextrose 10 % infusion   IntraVENous Continuous PRN Dalton Jacqueline, APRN - CNP        fenofibrate (TRIGLIDE) tablet 160 mg  160 mg Oral Daily Dalton Bur, APRN - CNP   160 mg at 03/16/23 9961    insulin lispro (HUMALOG) injection vial 0-8 Units  0-8 Units SubCUTAneous TID WC Jonathan Jacqueline, APRN - CNP   4 Units at 03/16/23 1256    insulin lispro (HUMALOG) injection vial 0-4 Units  0-4 Units SubCUTAneous Nightly Jonathan Phillips, APRN - CNP   4 Units at 03/16/23 2243    sodium chloride flush 0.9 % injection 5-40 mL  5-40 mL IntraVENous 2 times per day ABHILASH Clements - CNP   10 mL at 03/16/23 2559    sodium chloride flush 0.9 % injection 5-40 mL  5-40 mL IntraVENous PRN ABHILASH Greenberg CNP        0.9 % sodium chloride infusion   IntraVENous PRN ABHILASH Greenberg CNP        ondansetron (ZOFRAN-ODT) disintegrating tablet 4 mg  4 mg Oral Q8H PRN ABHILASH Greenberg CNP        Or    ondansetron [facility]) injection 4 mg  4 mg IntraVENous Q6H PRN ABHILASH Greenberg CNP        acetaminophen (TYLENOL) tablet 650 mg  650 mg Oral Q6H PRN ABHILASH Greenberg CNP        Or    acetaminophen (TYLENOL) suppository 650 mg  650 mg Rectal Q6H PRN ABHILASH Greenberg CNP   650 mg at 03/17/23 0431    polyethylene glycol (GLYCOLAX) packet 17 g  17 g Oral Daily PRN ABHILASH Greenberg CNP        aspirin chewable tablet 81 mg  81 mg Oral Daily ABHILASH Greenberg CNP   81 mg at 03/16/23 8408    nitroGLYCERIN (NITROSTAT) SL tablet 0.4 mg  0.4 mg SubLINGual Q5 Min PRN ABHILASH Greenberg CNP        potassium chloride (KLOR-CON M) extended release tablet 40 mEq  40 mEq Oral PRN ABHILASH Greenberg CNP        Or    potassium bicarb-citric acid (EFFER-K) effervescent tablet 40 mEq  40 mEq Oral PRN ABHILASH Greenberg CNP        Or    potassium chloride 10 mEq/100 mL IVPB (Peripheral Line)  10 mEq IntraVENous PRN ABHILASH Greenberg CNP        magnesium sulfate 2000 mg in 50 mL IVPB premix  2,000 mg IntraVENous PRN ABHILASH Greenberg - CNP   Stopped at 03/16/23 1936    aluminum & magnesium hydroxide-simethicone (MAALOX) 200-200-20 MG/5ML suspension 30 mL  30 mL Oral Q6H PRN ABHILASH Greenberg CNP        rosuvastatin (CRESTOR) tablet 40 mg  40 mg Oral Nightly ABHILASH Greenberg - CNP   40 mg at 03/15/23 2118    heparin (porcine) injection 4,000 Units  4,000 Units IntraVENous PRN ABHILASH Greenberg CNP   4,000 Units at 03/16/23 0239    heparin (porcine) injection 2,000 Units  2,000 Units IntraVENous PRN ABHILASH Greenberg - CNP   2,000 Units at 03/16/23 1257    heparin 25,000 units in dextrose 5% 250 mL (premix) infusion  5-30 Units/kg/hr IntraVENous Continuous Jenkatie Gomez, APRN - CNP   Stopped at 03/17/23 0359    0.9 % sodium chloride infusion   IntraVENous Continuous Geremias Gomez APRN - CNP 75 mL/hr at 03/16/23 2300 Restarted at 03/16/23 2300       Allergies:  No Known Allergies    Problem List:    Patient Active Problem List   Diagnosis Code    Diabetes mellitus type 1.5 ([facility]) E13.9    Coronary artery disease involving native heart without angina pectoris I25.10    Essential hypertension I10    Acquired hypothyroidism E03.9    Hyperlipidemia E78.5    NSTEMI (non-ST elevated myocardial infarction) ([facility] I21.4    History of coronary artery stent placement Z95.5    Cardiac arrest ([facility] I46.9    Acute respiratory failure with hypoxia ([facility]) J96.01    Ventricular arrhythmia I49.9       Past Medical History:        Diagnosis Date    Diabetes ([facility]     GERD (gastroesophageal reflux disease)     Gout     Hypercholesterolemia     Thyroid disease        Past Surgical History:        Procedure Laterality Date    CARDIAC PROCEDURE N/A 3/15/2023    LEFT HEART CATH / CORONARY ANGIOGRAPHY performed by Rae Robertson MD at [facility] CATH LAB   [address] [facility] [address]  2005    M. I. Social History:    Social History     Tobacco Use    Smoking status: Never    Smokeless tobacco: Never   Substance Use Topics    Alcohol use: Yes                                Counseling given: Not Answered      Vital Signs (Current): There were no vitals filed for this visit.                                            BP Readings from Last 3 Encounters:   03/17/23 (!) 148/62   03/15/23 (!) 186/90   08/11/21 128/84       NPO Status:                                                                                 BMI:   Wt Readings from Last 3 Encounters:   03/17/23 239 lb 10.2 oz (108.7 kg)   03/15/23 228 lb (103.4 kg)   08/11/21 232 lb (105.2 kg)     There is no height or weight on file to calculate BMI.    CBC:   Lab Results   Component Value Date/Time    WBC 5.4 03/17/2023 03:55 AM    RBC 2.98 03/17/2023 03:55 AM    HGB 9.1 03/17/2023 03:55 AM    HCT 27.5 03/17/2023 03:55 AM    MCV 92.3 03/17/2023 03:55 AM    RDW 13.6 03/17/2023 03:55 AM     03/17/2023 03:55 AM       CMP:   Lab Results   Component Value Date/Time     03/17/2023 03:55 AM    K 5.2 03/17/2023 03:55 AM     03/17/2023 03:55 AM    CO2 PENDING 03/17/2023 03:55 AM    BUN 36 03/17/2023 03:55 AM    CREATININE 2.40 03/17/2023 03:55 AM    GFRAA 50 02/11/2021 10:22 AM    AGRATIO 2.2 02/11/2021 10:22 AM    LABGLOM 29 03/17/2023 03:55 AM    GLUCOSE 419 03/17/2023 03:55 AM    PROT 6.4 03/16/2023 08:38 PM    CALCIUM PENDING 03/17/2023 03:55 AM    BILITOT 0.8 03/16/2023 08:38 PM    ALKPHOS 90 03/16/2023 08:38 PM    ALKPHOS 53 08/11/2021 09:16 AM    AST 1,197 03/16/2023 08:38 PM     03/16/2023 08:38 PM       POC Tests:   Recent Labs     03/17/23  0520 03/17/23  0756   POCGLU 422* 507*   POCNA 136  --    POCK 5.8*  --        Coags:   Lab Results   Component Value Date/Time    PROTIME 13.6 03/16/2023 08:58 AM    INR 1.0 03/16/2023 08:58 AM       HCG (If Applicable): No results found for: PREGTESTUR, PREGSERUM, HCG, HCGQUANT     ABGs: No results found for: PHART, PO2ART, HQL5GEI, ZPD4LGC, BEART, X9OFDZYC     Type & Screen (If Applicable):  No results found for: LABABO, LABRH    Drug/Infectious Status (If Applicable):  No results found for: HIV, HEPCAB    COVID-19 Screening (If Applicable):   Lab Results   Component Value Date/Time    COVID19 NOT DETECTED 03/15/2023 03:42 PM           Anesthesia Evaluation  Patient summary reviewed and Nursing notes reviewed  Airway: Mallampati: Unable to assess / NA  TM distance: >3 FB   Neck ROM: full  Mouth opening: > = 3 FB  Intubated Dental: normal exam         Pulmonary:Negative Pulmonary ROS and normal exam  breath sounds clear to auscultation Cardiovascular:  Exercise tolerance: poor (<4 METS),   (+) hypertension:, CABG/stent (2005 Stent x3):,         Rhythm: regular  Rate: normal                 ROS comment: New onset SOB and CP early last week  Presented with NSTEMI with 3 vessel obstruction, EF 55%, ventricular aneurysm. Neuro/Psych:   Negative Neuro/Psych ROS              GI/Hepatic/Renal:   (+) GERD:,           Endo/Other: Negative Endo/Other ROS   (+) DiabetesType II DM, , hypothyroidism::., .                 Abdominal:             Vascular: negative vascular ROS. Other Findings:             Anesthesia Plan      general     ASA 4     (I came to visit Mr. Anibal Larson earlier today. At the time he was awake and well talking on his phone with no symptoms. I learned a few hours later that he had a cardiac and pulmonary arrest likely 2/2 R on T phenomenon leading to Torsades. He recovered after one round CPR and 1mg Epi but was intubated and received CVL. Please see ICU and Code notes for full detail. Plan is for Dr. Shaka Celis to visit in am and decide on surgical status. Pt on levo, dobutamine and amio gtts overnight after arrest 5 pm yesterday. Levo and dobut were weaned around 4 am .  Pt had VT which was treated with amiodarone bolus; subsequent Vfib requiring CPR/shock/additional amiodarone bolus and lidocaine bolus around 5 am.  Renee started. BS  442 this am.  K 5.8. Am labs pending. Dr Shaka Celis to evaluate this am.    Dr Shaka Celis would like to proceed. Plan to place Impella intraop. High risk discussed with wife/daughter. --treated with SS insulin in CCU; will plan insulin gtt intraop. Cr 2.4 (from 2.2). PaO2 on ABG 76 on FIO2 1.0 noted.)  Induction: intravenous. arterial line, PA catheter, MONICA, central line and CVP    Anesthetic plan and risks discussed with patient.                         Segun Alexander MD   3/17/2023 6

## 2024-03-18 NOTE — H&P PST ADULT - NSICDXPASTMEDICALHX_GEN_ALL_CORE_FT
PAST MEDICAL HISTORY:  Class 2 obesity with body mass index (BMI) of 39.0 to 39.9 in adult     History of cellulitis     Hypercholesteremia     Hypertension     Osteoarthritis of left hip     Sleep apnea

## 2024-03-18 NOTE — H&P PST ADULT - PROBLEM SELECTOR PLAN 1
Left total hip replacement is planned for 3/27/2024  Diagnostic testing performed  Medical and cardiac clearances   Pre op instructions were reviewed including joint replacement protocols  Best wishes offered

## 2024-03-18 NOTE — H&P PST ADULT - HISTORY OF PRESENT ILLNESS
59 yo male with longstanding history of left hip pain with radiation to lower back and groin rating 10/10 at worst.  He is scheduled for left total anterior hip replacement on 3/27/2024 @ [facility].

## 2024-03-22 PROBLEM — Z87.2 PERSONAL HISTORY OF DISEASES OF THE SKIN AND SUBCUTANEOUS TISSUE: Chronic | Status: ACTIVE | Noted: 2024-03-18

## 2024-03-22 PROBLEM — E66.9 OBESITY, UNSPECIFIED: Chronic | Status: ACTIVE | Noted: 2024-03-18

## 2024-03-22 PROBLEM — G47.30 SLEEP APNEA, UNSPECIFIED: Chronic | Status: ACTIVE | Noted: 2024-03-18

## 2024-03-22 PROBLEM — E78.00 PURE HYPERCHOLESTEROLEMIA, UNSPECIFIED: Chronic | Status: ACTIVE | Noted: 2024-03-18

## 2024-03-22 PROBLEM — M16.12 UNILATERAL PRIMARY OSTEOARTHRITIS, LEFT HIP: Chronic | Status: ACTIVE | Noted: 2024-03-18

## 2024-03-25 PROBLEM — I10 ESSENTIAL (PRIMARY) HYPERTENSION: Chronic | Status: ACTIVE | Noted: 2024-03-18

## 2024-03-27 ENCOUNTER — TRANSCRIPTION ENCOUNTER (OUTPATIENT)
Age: 59
End: 2024-03-27

## 2024-03-27 ENCOUNTER — APPOINTMENT (OUTPATIENT)
Dept: ORTHOPEDIC SURGERY | Facility: HOSPITAL | Age: 59
End: 2024-03-27

## 2024-03-27 ENCOUNTER — OUTPATIENT (OUTPATIENT)
Dept: OUTPATIENT SERVICES | Facility: HOSPITAL | Age: 59
LOS: 1 days | End: 2024-03-27
Payer: COMMERCIAL

## 2024-03-27 VITALS
RESPIRATION RATE: 20 BRPM | HEIGHT: 73 IN | TEMPERATURE: 97 F | DIASTOLIC BLOOD PRESSURE: 76 MMHG | SYSTOLIC BLOOD PRESSURE: 159 MMHG | OXYGEN SATURATION: 97 % | WEIGHT: 315 LBS | HEART RATE: 86 BPM

## 2024-03-27 DIAGNOSIS — Z98.890 OTHER SPECIFIED POSTPROCEDURAL STATES: Chronic | ICD-10-CM

## 2024-03-27 DIAGNOSIS — M16.12 UNILATERAL PRIMARY OSTEOARTHRITIS, LEFT HIP: ICD-10-CM

## 2024-03-27 LAB — ABO RH CONFIRMATION: SIGNIFICANT CHANGE UP

## 2024-03-27 PROCEDURE — 27130 TOTAL HIP ARTHROPLASTY: CPT | Mod: AS,LT

## 2024-03-27 PROCEDURE — 99222 1ST HOSP IP/OBS MODERATE 55: CPT

## 2024-03-27 PROCEDURE — 27130 TOTAL HIP ARTHROPLASTY: CPT | Mod: LT

## 2024-03-27 DEVICE — BONE WAX 2.5GM: Type: IMPLANTABLE DEVICE | Site: LEFT | Status: FUNCTIONAL

## 2024-03-27 DEVICE — K-WIRE MICROAIRE (THREADED) SINGLE TROCAR 4.8MM X 9": Type: IMPLANTABLE DEVICE | Site: LEFT | Status: FUNCTIONAL

## 2024-03-27 DEVICE — LINER ACET EMPOWR HXE PLUS 4X40MM ALPHA I: Type: IMPLANTABLE DEVICE | Site: LEFT | Status: FUNCTIONAL

## 2024-03-27 DEVICE — SLEEVE BIOLOX DELTA OPTION NEUTRAL: Type: IMPLANTABLE DEVICE | Site: LEFT | Status: FUNCTIONAL

## 2024-03-27 DEVICE — CUP ACET EMPOWR CLUSTER 62MM ALPHA I: Type: IMPLANTABLE DEVICE | Site: LEFT | Status: FUNCTIONAL

## 2024-03-27 DEVICE — HEAD FEM OPTION CERAMIC DELTA 40MM: Type: IMPLANTABLE DEVICE | Site: LEFT | Status: FUNCTIONAL

## 2024-03-27 DEVICE — STEM BLADE EMPOWER SZ 10 LAT 132 DEG: Type: IMPLANTABLE DEVICE | Site: LEFT | Status: FUNCTIONAL

## 2024-03-27 DEVICE — SCREW ACET EMPOWR 6.5X35MM: Type: IMPLANTABLE DEVICE | Site: LEFT | Status: FUNCTIONAL

## 2024-03-27 DEVICE — SLEEVE BIOLOX DELTA OPTION SZ PLUS 4: Type: IMPLANTABLE DEVICE | Site: LEFT | Status: FUNCTIONAL

## 2024-03-27 RX ORDER — POLYETHYLENE GLYCOL 3350 17 G/17G
17 POWDER, FOR SOLUTION ORAL AT BEDTIME
Refills: 0 | Status: DISCONTINUED | OUTPATIENT
Start: 2024-03-27 | End: 2024-04-10

## 2024-03-27 RX ORDER — SODIUM CHLORIDE 9 MG/ML
500 INJECTION INTRAMUSCULAR; INTRAVENOUS; SUBCUTANEOUS ONCE
Refills: 0 | Status: COMPLETED | OUTPATIENT
Start: 2024-03-27 | End: 2024-03-27

## 2024-03-27 RX ORDER — HYDROMORPHONE HYDROCHLORIDE 2 MG/ML
0.5 INJECTION INTRAMUSCULAR; INTRAVENOUS; SUBCUTANEOUS
Refills: 0 | Status: DISCONTINUED | OUTPATIENT
Start: 2024-03-27 | End: 2024-03-27

## 2024-03-27 RX ORDER — DEXAMETHASONE 0.5 MG/5ML
8 ELIXIR ORAL ONCE
Refills: 0 | Status: COMPLETED | OUTPATIENT
Start: 2024-03-28 | End: 2024-03-28

## 2024-03-27 RX ORDER — ASPIRIN/CALCIUM CARB/MAGNESIUM 324 MG
81 TABLET ORAL EVERY 12 HOURS
Refills: 0 | Status: DISCONTINUED | OUTPATIENT
Start: 2024-03-28 | End: 2024-04-10

## 2024-03-27 RX ORDER — CEFAZOLIN SODIUM 1 G
2000 VIAL (EA) INJECTION EVERY 8 HOURS
Refills: 0 | Status: COMPLETED | OUTPATIENT
Start: 2024-03-27 | End: 2024-03-27

## 2024-03-27 RX ORDER — ONDANSETRON 8 MG/1
4 TABLET, FILM COATED ORAL ONCE
Refills: 0 | Status: DISCONTINUED | OUTPATIENT
Start: 2024-03-27 | End: 2024-03-27

## 2024-03-27 RX ORDER — HYDROCHLOROTHIAZIDE 25 MG
1 TABLET ORAL
Refills: 0 | DISCHARGE

## 2024-03-27 RX ORDER — TRANEXAMIC ACID 100 MG/ML
1000 INJECTION, SOLUTION INTRAVENOUS ONCE
Refills: 0 | Status: COMPLETED | OUTPATIENT
Start: 2024-03-27 | End: 2024-03-27

## 2024-03-27 RX ORDER — ALLOPURINOL 300 MG
0 TABLET ORAL
Refills: 0 | DISCHARGE

## 2024-03-27 RX ORDER — CHLORHEXIDINE GLUCONATE 213 G/1000ML
1 SOLUTION TOPICAL ONCE
Refills: 0 | Status: COMPLETED | OUTPATIENT
Start: 2024-03-27 | End: 2024-03-27

## 2024-03-27 RX ORDER — APREPITANT 80 MG/1
40 CAPSULE ORAL ONCE
Refills: 0 | Status: COMPLETED | OUTPATIENT
Start: 2024-03-27 | End: 2024-03-27

## 2024-03-27 RX ORDER — CEFAZOLIN SODIUM 1 G
3000 VIAL (EA) INJECTION ONCE
Refills: 0 | Status: COMPLETED | OUTPATIENT
Start: 2024-03-27 | End: 2024-03-27

## 2024-03-27 RX ORDER — CELECOXIB 200 MG/1
200 CAPSULE ORAL EVERY 12 HOURS
Refills: 0 | Status: DISCONTINUED | OUTPATIENT
Start: 2024-03-27 | End: 2024-04-10

## 2024-03-27 RX ORDER — ATORVASTATIN CALCIUM 80 MG/1
10 TABLET, FILM COATED ORAL AT BEDTIME
Refills: 0 | Status: DISCONTINUED | OUTPATIENT
Start: 2024-03-27 | End: 2024-04-10

## 2024-03-27 RX ORDER — ONDANSETRON 8 MG/1
4 TABLET, FILM COATED ORAL EVERY 6 HOURS
Refills: 0 | Status: DISCONTINUED | OUTPATIENT
Start: 2024-03-27 | End: 2024-04-10

## 2024-03-27 RX ORDER — SENNA PLUS 8.6 MG/1
2 TABLET ORAL AT BEDTIME
Refills: 0 | Status: DISCONTINUED | OUTPATIENT
Start: 2024-03-27 | End: 2024-04-10

## 2024-03-27 RX ORDER — ACETAMINOPHEN 500 MG
1000 TABLET ORAL ONCE
Refills: 0 | Status: COMPLETED | OUTPATIENT
Start: 2024-03-27 | End: 2024-03-27

## 2024-03-27 RX ORDER — SODIUM CHLORIDE 9 MG/ML
1000 INJECTION, SOLUTION INTRAVENOUS
Refills: 0 | Status: DISCONTINUED | OUTPATIENT
Start: 2024-03-27 | End: 2024-03-27

## 2024-03-27 RX ORDER — VALSARTAN 80 MG/1
320 TABLET ORAL DAILY
Refills: 0 | Status: DISCONTINUED | OUTPATIENT
Start: 2024-03-29 | End: 2024-04-10

## 2024-03-27 RX ORDER — HYDROMORPHONE HYDROCHLORIDE 2 MG/ML
0.2 INJECTION INTRAMUSCULAR; INTRAVENOUS; SUBCUTANEOUS
Refills: 0 | Status: DISCONTINUED | OUTPATIENT
Start: 2024-03-27 | End: 2024-03-27

## 2024-03-27 RX ORDER — ATORVASTATIN CALCIUM 80 MG/1
1 TABLET, FILM COATED ORAL
Refills: 0 | DISCHARGE

## 2024-03-27 RX ORDER — SODIUM CHLORIDE 9 MG/ML
1000 INJECTION, SOLUTION INTRAVENOUS
Refills: 0 | Status: DISCONTINUED | OUTPATIENT
Start: 2024-03-27 | End: 2024-04-10

## 2024-03-27 RX ORDER — OXYCODONE HYDROCHLORIDE 5 MG/1
10 TABLET ORAL
Refills: 0 | Status: DISCONTINUED | OUTPATIENT
Start: 2024-03-27 | End: 2024-03-27

## 2024-03-27 RX ORDER — ACETAMINOPHEN 500 MG
1000 TABLET ORAL EVERY 8 HOURS
Refills: 0 | Status: DISCONTINUED | OUTPATIENT
Start: 2024-03-27 | End: 2024-04-10

## 2024-03-27 RX ORDER — VALSARTAN 80 MG/1
1 TABLET ORAL
Refills: 0 | DISCHARGE

## 2024-03-27 RX ORDER — ACETAMINOPHEN 500 MG
1000 TABLET ORAL ONCE
Refills: 0 | Status: DISCONTINUED | OUTPATIENT
Start: 2024-03-27 | End: 2024-03-27

## 2024-03-27 RX ORDER — OXYCODONE HYDROCHLORIDE 5 MG/1
5 TABLET ORAL ONCE
Refills: 0 | Status: DISCONTINUED | OUTPATIENT
Start: 2024-03-27 | End: 2024-03-27

## 2024-03-27 RX ORDER — OXYCODONE HYDROCHLORIDE 5 MG/1
5 TABLET ORAL
Refills: 0 | Status: DISCONTINUED | OUTPATIENT
Start: 2024-03-27 | End: 2024-03-27

## 2024-03-27 RX ORDER — PANTOPRAZOLE SODIUM 20 MG/1
40 TABLET, DELAYED RELEASE ORAL
Refills: 0 | Status: DISCONTINUED | OUTPATIENT
Start: 2024-03-27 | End: 2024-04-10

## 2024-03-27 RX ORDER — ALLOPURINOL 300 MG
400 TABLET ORAL DAILY
Refills: 0 | Status: DISCONTINUED | OUTPATIENT
Start: 2024-03-27 | End: 2024-04-10

## 2024-03-27 RX ORDER — MAGNESIUM HYDROXIDE 400 MG/1
30 TABLET, CHEWABLE ORAL DAILY
Refills: 0 | Status: DISCONTINUED | OUTPATIENT
Start: 2024-03-27 | End: 2024-04-10

## 2024-03-27 RX ORDER — ALLOPURINOL 300 MG
1 TABLET ORAL
Refills: 0 | DISCHARGE

## 2024-03-27 RX ADMIN — Medication 100 MILLIGRAM(S): at 15:51

## 2024-03-27 RX ADMIN — APREPITANT 40 MILLIGRAM(S): 80 CAPSULE ORAL at 07:10

## 2024-03-27 RX ADMIN — SODIUM CHLORIDE 100 MILLILITER(S): 9 INJECTION, SOLUTION INTRAVENOUS at 10:50

## 2024-03-27 RX ADMIN — SODIUM CHLORIDE 1000 MILLILITER(S): 9 INJECTION INTRAMUSCULAR; INTRAVENOUS; SUBCUTANEOUS at 12:11

## 2024-03-27 RX ADMIN — Medication 400 MILLIGRAM(S): at 14:23

## 2024-03-27 RX ADMIN — Medication 100 MILLIGRAM(S): at 23:54

## 2024-03-27 RX ADMIN — CELECOXIB 200 MILLIGRAM(S): 200 CAPSULE ORAL at 22:25

## 2024-03-27 RX ADMIN — CELECOXIB 200 MILLIGRAM(S): 200 CAPSULE ORAL at 21:17

## 2024-03-27 RX ADMIN — SODIUM CHLORIDE 100 MILLILITER(S): 9 INJECTION, SOLUTION INTRAVENOUS at 21:23

## 2024-03-27 RX ADMIN — Medication 1000 MILLIGRAM(S): at 21:17

## 2024-03-27 RX ADMIN — SODIUM CHLORIDE 100 MILLILITER(S): 9 INJECTION, SOLUTION INTRAVENOUS at 14:23

## 2024-03-27 RX ADMIN — Medication 1000 MILLIGRAM(S): at 14:52

## 2024-03-27 RX ADMIN — SENNA PLUS 2 TABLET(S): 8.6 TABLET ORAL at 21:18

## 2024-03-27 RX ADMIN — CHLORHEXIDINE GLUCONATE 1 APPLICATION(S): 213 SOLUTION TOPICAL at 07:10

## 2024-03-27 RX ADMIN — ATORVASTATIN CALCIUM 10 MILLIGRAM(S): 80 TABLET, FILM COATED ORAL at 21:18

## 2024-03-27 RX ADMIN — SODIUM CHLORIDE 1000 MILLILITER(S): 9 INJECTION INTRAMUSCULAR; INTRAVENOUS; SUBCUTANEOUS at 16:56

## 2024-03-27 RX ADMIN — Medication 1000 MILLIGRAM(S): at 22:25

## 2024-03-27 NOTE — BRIEF OPERATIVE NOTE - FIRST ASSIST PARTICIPATION DETAILS - (COMPONENTS OF THE PROCEDURE THAT ASSISTANT PARTICIPATED IN)
Monthly
I acted within this role throughout the entirety of the procedure performed by the primary surgeon

## 2024-03-27 NOTE — DISCHARGE NOTE PROVIDER - NSDCMRMEDTOKEN_GEN_ALL_CORE_FT
allopurinol 100 mg oral tablet: orally once a day  allopurinol 300 mg oral tablet: 1 tab(s) orally once a day  atorvastatin 10 mg oral tablet: 1 tab(s) orally once a day  hydroCHLOROthiazide 25 mg oral tablet: 1 tab(s) orally once a day  valsartan 320 mg oral tablet: 1 tab(s) orally once a day   acetaminophen 500 mg oral tablet: 2 tab(s) orally every 8 hours  allopurinol 100 mg oral tablet: orally once a day  allopurinol 300 mg oral tablet: 1 tab(s) orally once a day  Aspirin EC 81 mg oral delayed release tablet: 1 tab(s) orally every 12 hours take at least 2 hours before celebrex/meloxicam  atorvastatin 10 mg oral tablet: 1 tab(s) orally once a day  CeleBREX 200 mg oral capsule: 1 cap(s) orally every 12 hours Take at lest 2 hours after aspirin  hydroCHLOROthiazide 25 mg oral tablet: 1 tab(s) orally once a day  omeprazole 20 mg oral delayed release capsule: 1 cap(s) orally once a day  oxyCODONE 5 mg oral tablet: 1 tab(s) orally every 4 hours as needed for  moderate pain May take 2 tabs for severe pain MDD: 6  valsartan 320 mg oral tablet: 1 tab(s) orally once a day

## 2024-03-27 NOTE — CARE COORDINATION ASSESSMENT. - NSPASTMEDSURGHISTORY_GEN_ALL_CORE_FT
PAST MEDICAL & SURGICAL HISTORY:  Class 2 obesity with body mass index (BMI) of 39.0 to 39.9 in adult      Sleep apnea      Hypercholesteremia      Hypertension      History of cellulitis      Osteoarthritis of left hip      History of lumbar discectomy

## 2024-03-27 NOTE — DISCHARGE NOTE NURSING/CASE MANAGEMENT/SOCIAL WORK - NSSCNAMETXT_GEN_ALL_CORE
[facility] care agency [phone] will reach out to you within 24-72 hours of your discharge to schedule home care visit/eval appointment with you. Please call agency for any queries regarding home care services

## 2024-03-27 NOTE — CONSULT NOTE ADULT - ASSESSMENT
Assessment and Recommendation:   · Assessment	  58 S/P L THR    Aftercare following surgery  -WBAT  -PT/OT  -Early ambulation  -Pain management  -Incentive Spirometry  -DVT ppx as per ortho    KEILY  Pulm eval  Will order CPAP and pulse ox    HTN/HLD  Cont home meds

## 2024-03-27 NOTE — DISCHARGE NOTE PROVIDER - CARE PROVIDER_API CALL
Naga Bauer  Orthopaedic Surgery  [address], [address]  [place] [address]  Phone: [phone]  Fax: [phone]  Scheduled Appointment: 04/12/2024 09:15 AM

## 2024-03-27 NOTE — DISCHARGE NOTE PROVIDER - NSDCCPCAREPLAN_GEN_ALL_CORE_FT
PRINCIPAL DISCHARGE DIAGNOSIS  Diagnosis: Osteoarthritis of left hip  Assessment and Plan of Treatment: Your new total hip replacement requires proper care.  Your surgical care provider is your best resource for information.  Physical Therapy/Occupational Therapy for: Ambulation, transfers, stairs, & ADLs, isometrics.  Full weight bearing on both legs; Walker/cane use as instructed by Physical therapy/Occupational therapy.  Anterior Total Hip Replacement precautions for  6 weeks:  - No straight leg raise;  - No external rotation of hip when extended-standing or lying flat; No hyperextension of hip when standing (kickback).  - Use pain medication if needed as prescribed.  Ice packs are a helpful addition to your comfort.  Applying a  waterproof ice 20 minutes on alternating with 20 minutes off for 48 hours post op pack over a cloth or thin towel to the site for 30 minutes per hour may provide benefit by reducing swelling and discomfort.  - Keep incision clean and dry.   -Take short, frequent walks increasing the distance that you walk each day as tolerated.  - Change your position every hour to decrease pain and stiffness.  - Continue the exercises taught to you by your physical therapist.  - No driving until cleared by the doctor.  - No tub baths, hot tubs, or swimming pools until instructed by your doctor.  REYNALDO dressing: Safe to use dressing in shower as directed. Must disconnect battery pack from tubing and set aside somewhere dry. Once finished showering, pat dressing dry and reattach battery pack. Press orange button to resume therapy. Do not soak, scrub, or submerge incision in water. After 7 days remove entire dressing and dispose in trash. May leave incision open to air if no drainage.  Prineo tape removal on/near after Post Op Day # 14 in your Surgeon's office.

## 2024-03-27 NOTE — DISCHARGE NOTE PROVIDER - NSDCFUADDINST_GEN_ALL_CORE_FT
- Call your doctor if you experience:  • An increase in pain not controlled by pain medication or change in activity or  position.  • Temperature greater than 101° F.  • Redness, increased swelling or foul smelling drainage from or around the  incision.  • Numbness, tingling or a change in color or temperature of the operative leg.  • Call your doctor immediately if you experience chest pain, shortness of breath or calf pain.  For Constipation :   • Increase your water intake. Drink at least 8 glasses of water daily.  • Try adding fiber to your diet by eating fruits, vegetables and foods that are rich in grains.  • If you do experience constipation, you may take an over-the-counter stool softener/laxative such as Toshia Colace, [place] or  Milk of Magnesia.

## 2024-03-27 NOTE — PATIENT PROFILE ADULT - FALL HARM RISK - UNIVERSAL INTERVENTIONS
Bed in lowest position, wheels locked, appropriate side rails in place/Call bell, personal items and telephone in reach/Instruct patient to call for assistance before getting out of bed or chair/Non-slip footwear when patient is out of bed/[place] to call system/Physically safe environment - no spills, clutter or unnecessary equipment/Purposeful Proactive Rounding/Room/bathroom lighting operational, light cord in reach

## 2024-03-27 NOTE — CARE COORDINATION ASSESSMENT. - NSDCPLANSERVICES_GEN_ALL_CORE
CM met with patient at bedside.  Patient. A&O x 4. Pt s/p  PROCEDURES: Total left hip replacement.   Pt  resting comfortably / Pt has no complaints at this time.  CM explained role of CM and availability to assist with discharge planning throughout stay.   Provided CM contact information and pt. verbalized understanding. Patient lives in a private house with his wife and 2 adult children, 3 steps to enter and multiple steps inside, patient will stay on the first floor. Prior to admission patient was ind in adls. Patient identified his wife  as his caregiver at home who will assist him during his recuperation and will transport him home and to MD appointments.   Pt. is agreeable with PT/OT's recommendations for d/c plan to home with HC/PT.  CM explained home care expectations, process, insurance provisions and home safety with good understanding.   Offered list of [facility] and pt. chose [facility] at home care agency.  Provided discharge resources folder. CM made referral accordingly.  Informed them of Anticipated  DC date for 3/28/2024 and for SOC 3/28/2024.     Noted pt has a REYNALDO drsg in place/  Educational flyer provided and reviewed with the patient.  Pt verbalizing understanding and in agreement with d/c plans.  DME: Pt has a RW at home. rx  for commode sent  [facility] [phone].  PCP: Dr Apollo Vivas   Pharm: [facility] [phone]/Home Care

## 2024-03-27 NOTE — CONSULT NOTE ADULT - SUBJECTIVE AND OBJECTIVE BOX
Subjective and Objective:   FROM ADMISSION H+P:   HPI: 57 yo male with longstanding history of left hip pain with radiation to lower back and groin rating 10/10 at worst.  Patient presenting for elective L THR. Patient seen post op. Tolerated procedure well without complications. C/o mild pain at surgical site. Denies any new complaints.      ----  PAST MEDICAL & SURGICAL HISTORY:  Osteoarthritis of left hip      History of cellulitis      Hypertension      Hypercholesteremia      Sleep apnea      Class 2 obesity with body mass index (BMI) of 39.0 to 39.9 in adult      History of lumbar discectomy          ----  Home Medications:  allopurinol 100 mg oral tablet: orally once a day (27 Mar 2024 06:37)  allopurinol 300 mg oral tablet: 1 tab(s) orally once a day (27 Mar 2024 06:37)  atorvastatin 10 mg oral tablet: 1 tab(s) orally once a day (27 Mar 2024 06:37)  hydroCHLOROthiazide 25 mg oral tablet: 1 tab(s) orally once a day (27 Mar 2024 06:37)  valsartan 320 mg oral tablet: 1 tab(s) orally once a day (27 Mar 2024 06:37)    ----  FAMILY HISTORY:  Family history of brain tumor (Child)    Family history of prostate cancer (Father)    Family history of dementia (Mother)        ----  Allergies    No Known Allergies    Intolerances    vancomycin (Other)      ----  Social History:  Denies current alcohol, tobacco or drug use     ----  REVIEW OF SYSTEMS:  CONSTITUTIONAL: denies fever, chills, fatigue, weakness  HEENT: denies blurred vision, sore throat  SKIN: denies new lesions, rash  CARDIOVASCULAR: denies chest pain, chest pressure, palpitations  RESPIRATORY: denies shortness of breath, sputum production  GASTROINTESTINAL: denies nausea, vomiting, diarrhea, abdominal pain  GENITOURINARY: denies dysuria, discharge  NEUROLOGICAL: denies numbness, headache, focal weakness  MUSCULOSKELETAL: denies new joint pain, muscle aches  HEMATOLOGIC: denies gross bleeding, bruising    ----  PHYSICAL EXAM:  GENERAL: patient appears well, no acute distress, appropriately interactive  EYES: sclera clear, no exudates  LUNGS: good air entry bilaterally, clear to auscultation, symmetric breath sounds  HEART: soft S1/S2, regular rate and rhythm, no murmurs noted, no noted edema to bilateral lower extremities  GASTROINTESTINAL: abdomen is soft, nontender, nondistended, normoactive bowel sounds, no palpable masses  INTEGUMENT: good skin turgor, appropriate for ethnicity, appears well perfused, no jaundice noted  MUSCULOSKELETAL: dressing clean/dry/intact, no clubbing or cyanosis, no obvious deformity  NEUROLOGIC: awake, alert, oriented x3, good muscle tone in 4 extremities, no obvious sensory deficits  PSYCHIATRIC: mood is good, affect is congruent with mood, linear and logical thought process    T(C): 36.7 (03-27-24 @ 12:35), Max: 36.7 (03-27-24 @ 12:35)  HR: 82 (03-27-24 @ 12:35) (75 - 86)  BP: 132/70 (03-27-24 @ 12:35) (112/60 - 159/76)  RR: 18 (03-27-24 @ 12:35) (13 - 22)  SpO2: 96% (03-27-24 @ 12:35) (96% - 100%)  Wt(kg): --    ----  INPATIENT MEDICATIONS      ----  I&O's Summary      LABS:              CAPILLARY BLOOD GLUCOSE                    ----  Personally reviewed:  Vital sign trends: [ x ] yes    [  ] no     [  ] n/a  Laboratory results: [x  ] yes    [  ] no     [  ] n/a

## 2024-03-27 NOTE — PHYSICAL THERAPY INITIAL EVALUATION ADULT - ADDITIONAL COMMENTS
pt lives with wife and kids. Has 3 RAFITA and multiple sets of stairs in home. Pt has RW. Pt will stay on first floor.

## 2024-03-27 NOTE — PHYSICAL THERAPY INITIAL EVALUATION ADULT - PERTINENT HX OF CURRENT PROBLEM, REHAB EVAL
59 yo male with longstanding history of left hip pain with radiation to lower back and groin rating 10/10 at worst.  He is scheduled for left total anterior hip replacement on 3/27/2024 @ [facility].

## 2024-03-27 NOTE — CARE COORDINATION ASSESSMENT. - NSCAREPROVIDERS_GEN_ALL_CORE_FT
CARE PROVIDERS:  Administration: Hari Torres  Admitting: Naga Bauer  Attending: Naga Bauer  Consultant: Hari Mitchell  Nurse: Brina Flanagan  Nurse: La Quintero  Nurse: Radha Pompa  Nurse: Marilynn Saldivar  Nurse: Jyoti Turner  Occupational Therapy: Genie Moss  Override: Marilynn Saldivar  Physical Therapy: Hugh Us  Physical Therapy: Meagan Drake  Physical Therapy: Judie Hauser  Physical Therapy: Lynn Cuenca  Physical Therapy: Leona Lagos  Primary Team: Stanley Franco  Primary Team: Izabela Prescott  Primary Team: Alvin Morales  Primary Team: Jeny Perry  Primary Team: Radha Schafer  Primary Team: Debra Corbett// Supp. Assoc.: Nica Cooper

## 2024-03-27 NOTE — DISCHARGE NOTE NURSING/CASE MANAGEMENT/SOCIAL WORK - NSDCDMETYPESERV_GEN_ALL_CORE_FT
[___ Days ago] : [unfilled] days ago [FreeTextEntry8] : +ST, +"swollen gland" under chin +slight cough, +HA, +RN/PND, +sinus pressure, +congestion x 3-4 days. Afrin and advil with some relief. Denies fevers, chills, shortness of breath, ear pain, body aches COMMODE

## 2024-03-27 NOTE — PATIENT PROFILE ADULT - FUNCTIONAL ASSESSMENT - DAILY ACTIVITY 6.
Take the metronidazole 3 times daily for 7 days.    Continue the probiotic and the metamucil as you have been   4 = No assist / stand by assistance

## 2024-03-27 NOTE — CONSULT NOTE ADULT - ASSESSMENT
patient was admitted to [facility] with a history of severe degenerative joint disease of the left hip.    OP  OA  left HIP - THR  KEILY  HTN  Obesity   patient was admitted to [facility] with a history of severe degenerative joint disease of the left hip.    OP  OA  left HIP - THR  KEILY  HTN  Obesity    post op care  I mima  dvt p  pain relief  caution with OPIOIDS in pt with KEILY  bowel rx regimen  PT   Ortho f/u  wound care  weight management discussion  CPAP use - pt will use own machine with full face mask - sleep hygiene reviewed

## 2024-03-27 NOTE — DISCHARGE NOTE NURSING/CASE MANAGEMENT/SOCIAL WORK - NSDCPEFALRISK_GEN_ALL_CORE
For information on Fall & Injury Prevention, visit: [email or web address] OR  [email or web address] OR  https://www.cdc.gov/steadi/patient.html

## 2024-03-27 NOTE — OCCUPATIONAL THERAPY INITIAL EVALUATION ADULT - ADDITIONAL COMMENTS
Pt reports living in a private home 3 RAFITA and 2 flights of stairs inside the home. Pt can stay on first floor with bathroom +stall shower. Pt used a sock aid PTA

## 2024-03-27 NOTE — DISCHARGE NOTE PROVIDER - NSDCFUSCHEDAPPT_GEN_ALL_CORE_FT
Naga Bauer  [facility] Physician [facility]  ORTHOSURG [address]  Scheduled Appointment: 03/27/2024    Naga Bauer  [facility] PreAdmits.  Scheduled Appointment: 03/27/2024    Naga Bauer  [facility]  ORTHOSURG [address]  Scheduled Appointment: 04/12/2024    Naga Bauer  [facility]  ORTHOSURG [address]  Scheduled Appointment: 05/31/2024     Naga Bauer [facility]  [address]OSURG [address]  Scheduled Appointment: 04/12/2024    Naga Bauer [facility]  ORTHOSURG [address]  Scheduled Appointment: 05/31/2024

## 2024-03-27 NOTE — BRIEF OPERATIVE NOTE - NSICDXBRIEFPROCEDURE_GEN_ALL_CORE_FT
PROCEDURES:  Total replacement of left hip joint by anterior approach 27-Mar-2024 09:56:13  Alvin Morales

## 2024-03-27 NOTE — DISCHARGE NOTE NURSING/CASE MANAGEMENT/SOCIAL WORK - PATIENT PORTAL LINK FT
You can access the FollowMyHealth Patient Portal offered by [facility] by registering at the following website: [email or web address]. By joining [facility]’s FollowMyHealth portal, you will also be able to view your health information using other applications (apps) compatible with our system.

## 2024-03-27 NOTE — DISCHARGE NOTE PROVIDER - HOSPITAL COURSE
This patient was admitted to [facility] with a history of severe degenerative joint disease of the left hip.  Patient went to Pre-Surgical Testing at [facility] and was medically cleared to undergo elective procedure. Patient underwent Left Anterior Total Hip Arthroplasty by  on 3/27/2024.  No operative or yoel-operative complications arose during patients hospital course.  Patient received antibiotic according to SCIP guidelines for infection prevention.  Ecotrin was given for DVT prophylaxis.  Anesthesia, Medical Hospitalist, Physical Therapy and Occupational Therapy were consulted. Patient is stable for discharge with a good prognosis.  Appropriate discharge instructions and medications are provided in this document.   This patient was admitted to [facility] with a history of severe degenerative joint disease of the left hip.  Patient went to Pre-Surgical Testing at [facility] and was medically cleared to undergo elective left anterior total hip replacement. Patient underwent Left Anterior Total Hip Arthroplasty by  on 3/27/2024.  No operative or yoel-operative complications arose during patients hospital course.  Patient received antibiotic according to SCIP guidelines for infection prevention.  Ecotrin was given for DVT prophylaxis.  Anesthesia, Medical Hospitalist, Physical Therapy and Occupational Therapy were consulted. Patient is stable for discharge with a good prognosis.  Appropriate discharge instructions and medications are provided in this document. Patient going home with home care (skilled nursing, PT/OT).

## 2024-03-28 VITALS
RESPIRATION RATE: 18 BRPM | SYSTOLIC BLOOD PRESSURE: 123 MMHG | DIASTOLIC BLOOD PRESSURE: 64 MMHG | OXYGEN SATURATION: 97 % | HEART RATE: 71 BPM | TEMPERATURE: 98 F

## 2024-03-28 LAB
ANION GAP SERPL CALC-SCNC: 10 MMOL/L — SIGNIFICANT CHANGE UP (ref 5–17)
BUN SERPL-MCNC: 15 MG/DL — SIGNIFICANT CHANGE UP (ref 7–23)
CALCIUM SERPL-MCNC: 8.6 MG/DL — SIGNIFICANT CHANGE UP (ref 8.4–10.5)
CHLORIDE SERPL-SCNC: 102 MMOL/L — SIGNIFICANT CHANGE UP (ref 96–108)
CO2 SERPL-SCNC: 25 MMOL/L — SIGNIFICANT CHANGE UP (ref 22–31)
CREAT SERPL-MCNC: 0.96 MG/DL — SIGNIFICANT CHANGE UP (ref 0.5–1.3)
EGFR: 91 ML/MIN/1.73M2 — SIGNIFICANT CHANGE UP
GLUCOSE SERPL-MCNC: 125 MG/DL — HIGH (ref 70–99)
HCT VFR BLD CALC: 34.7 % — LOW (ref 39–50)
HGB BLD-MCNC: 11.8 G/DL — LOW (ref 13–17)
MCHC RBC-ENTMCNC: 31.9 PG — SIGNIFICANT CHANGE UP (ref 27–34)
MCHC RBC-ENTMCNC: 34 GM/DL — SIGNIFICANT CHANGE UP (ref 32–36)
MCV RBC AUTO: 93.8 FL — SIGNIFICANT CHANGE UP (ref 80–100)
NRBC # BLD: 0 /100 WBCS — SIGNIFICANT CHANGE UP (ref 0–0)
PLATELET # BLD AUTO: 248 K/UL — SIGNIFICANT CHANGE UP (ref 150–400)
POTASSIUM SERPL-MCNC: 3.9 MMOL/L — SIGNIFICANT CHANGE UP (ref 3.5–5.3)
POTASSIUM SERPL-SCNC: 3.9 MMOL/L — SIGNIFICANT CHANGE UP (ref 3.5–5.3)
RBC # BLD: 3.7 M/UL — LOW (ref 4.2–5.8)
RBC # FLD: 12.9 % — SIGNIFICANT CHANGE UP (ref 10.3–14.5)
SODIUM SERPL-SCNC: 137 MMOL/L — SIGNIFICANT CHANGE UP (ref 135–145)
WBC # BLD: 13.9 K/UL — HIGH (ref 3.8–10.5)
WBC # FLD AUTO: 13.9 K/UL — HIGH (ref 3.8–10.5)

## 2024-03-28 PROCEDURE — 97116 GAIT TRAINING THERAPY: CPT

## 2024-03-28 PROCEDURE — 97530 THERAPEUTIC ACTIVITIES: CPT

## 2024-03-28 PROCEDURE — C1776: CPT

## 2024-03-28 PROCEDURE — 36415 COLL VENOUS BLD VENIPUNCTURE: CPT

## 2024-03-28 PROCEDURE — C1713: CPT

## 2024-03-28 PROCEDURE — 80048 BASIC METABOLIC PNL TOTAL CA: CPT

## 2024-03-28 PROCEDURE — 76000 FLUOROSCOPY <1 HR PHYS/QHP: CPT

## 2024-03-28 PROCEDURE — 97161 PT EVAL LOW COMPLEX 20 MIN: CPT

## 2024-03-28 PROCEDURE — 97535 SELF CARE MNGMENT TRAINING: CPT

## 2024-03-28 PROCEDURE — 97165 OT EVAL LOW COMPLEX 30 MIN: CPT

## 2024-03-28 PROCEDURE — 99232 SBSQ HOSP IP/OBS MODERATE 35: CPT

## 2024-03-28 PROCEDURE — 85027 COMPLETE CBC AUTOMATED: CPT

## 2024-03-28 PROCEDURE — 27130 TOTAL HIP ARTHROPLASTY: CPT | Mod: LT

## 2024-03-28 PROCEDURE — C1889: CPT

## 2024-03-28 RX ORDER — ACETAMINOPHEN 500 MG
2 TABLET ORAL
Qty: 0 | Refills: 0 | DISCHARGE
Start: 2024-03-28

## 2024-03-28 RX ORDER — OMEPRAZOLE 10 MG/1
1 CAPSULE, DELAYED RELEASE ORAL
Qty: 30 | Refills: 0
Start: 2024-03-28 | End: 2024-04-26

## 2024-03-28 RX ORDER — ASPIRIN/CALCIUM CARB/MAGNESIUM 324 MG
1 TABLET ORAL
Qty: 56 | Refills: 0
Start: 2024-03-28 | End: 2024-04-24

## 2024-03-28 RX ORDER — CELECOXIB 200 MG/1
1 CAPSULE ORAL
Qty: 60 | Refills: 0
Start: 2024-03-28 | End: 2024-04-26

## 2024-03-28 RX ORDER — OXYCODONE HYDROCHLORIDE 5 MG/1
1 TABLET ORAL
Qty: 42 | Refills: 0
Start: 2024-03-28 | End: 2024-04-03

## 2024-03-28 RX ADMIN — PANTOPRAZOLE SODIUM 40 MILLIGRAM(S): 20 TABLET, DELAYED RELEASE ORAL at 05:17

## 2024-03-28 RX ADMIN — Medication 101.6 MILLIGRAM(S): at 05:16

## 2024-03-28 RX ADMIN — SODIUM CHLORIDE 100 MILLILITER(S): 9 INJECTION, SOLUTION INTRAVENOUS at 05:16

## 2024-03-28 RX ADMIN — Medication 1000 MILLIGRAM(S): at 12:58

## 2024-03-28 RX ADMIN — Medication 1000 MILLIGRAM(S): at 06:56

## 2024-03-28 RX ADMIN — Medication 1000 MILLIGRAM(S): at 12:56

## 2024-03-28 RX ADMIN — Medication 1000 MILLIGRAM(S): at 05:17

## 2024-03-28 RX ADMIN — CELECOXIB 200 MILLIGRAM(S): 200 CAPSULE ORAL at 09:52

## 2024-03-28 RX ADMIN — Medication 400 MILLIGRAM(S): at 12:56

## 2024-03-28 RX ADMIN — CELECOXIB 200 MILLIGRAM(S): 200 CAPSULE ORAL at 09:51

## 2024-03-28 RX ADMIN — Medication 81 MILLIGRAM(S): at 05:17

## 2024-03-28 NOTE — PROGRESS NOTE ADULT - ASSESSMENT
patient was admitted to [facility] with a history of severe degenerative joint disease of the left hip.    OP  OA  left HIP - THR  KEILY  HTN  Obesity    POD 1    post op care  I mima  dvt p  pain relief  caution with OPIOIDS in pt with KEILY  bowel rx regimen  PT   Ortho f/u  wound care  weight management discussion  CPAP use - pt will use own machine with full face mask - sleep hygiene reviewed
  Assessment and Recommendation:   · Assessment	  58 S/P L THR    Aftercare following surgery  -WBAT  -PT/OT  -Pain management  -Incentive Spirometry  -DVT ppx as per ortho    -Mild leukocytosis post op. Likely reactive and due to steroids. Currently afebrile and no s/s of infection. Outpatient follow up with PCP for repeat blood work.  -Mild anemia post op likely due to acute blood loss. Repeat cbc with PCP within 1-3 days of discharge    KEILY  Pulm eval noted  Outpatient follow up    HTN/HLD  Cont home meds    Patient is medically stable for discharge with close outpatient follow up once cleared by PT and ortho

## 2024-03-28 NOTE — PROGRESS NOTE ADULT - SUBJECTIVE AND OBJECTIVE BOX
Post Op Day #1  SUBJECTIVE    59yo Male status post left ant THR .   Patient is alert and comfortable.    Pain is controlled with current pain regimen.  Denies nausea, vomiting, chest pain, shortness of breath, abdominal pain or fever.   No new complaints.    OBJECTIVE    Vital Signs Last 24 Hrs  T(C): 36.6 (28 Mar 2024 07:53), Max: 37.2 (27 Mar 2024 20:17)  T(F): 97.8 (28 Mar 2024 07:53), Max: 99 (27 Mar 2024 20:17)  HR: 71 (28 Mar 2024 07:53) (71 - 84)  BP: 123/64 (28 Mar 2024 07:53) (110/62 - 141/70)  BP(mean): --  RR: 18 (28 Mar 2024 07:53) (13 - 22)  SpO2: 97% (28 Mar 2024 07:53) (93% - 100%)    Parameters below as of 28 Mar 2024 07:53  Patient On (Oxygen Delivery Method): room air      I&O's Summary    27 Mar 2024 07:01  -  28 Mar 2024 07:00  --------------------------------------------------------  IN: 2050 mL / OUT: 750 mL / NET: 1300 mL        PHYSICAL EXAM    Left hip REYNALDO dressing is clean, dry and intact.   The calf is supple/nontender.   Sensation to light touch is grossly intact distally.   Motor function distally is intact.   No foot drop.   (2+) dorsalis pedis pulse. Capillary refill is less than 2 seconds. No cyanosis.                          11.8<L>  13.90<H> )-----------( 248      ( 28 Mar 2024 06:00 )             34.7<L>  28 Mar 2024 06:00    28 Mar 2024 06:00    137    |  102    |  15     ----------------------------<  125<H>  3.9     |  25     |  0.96     Ca    8.6        28 Mar 2024 06:00        ASSESSMENT AND PLAN    - Orthopedically stable  - DVT prophylaxis: PAS + Aspirin 81mg every 12 hours  - HO Prophylaxis: Celebrex 200mg PO twice daily x21 days  - Continue physical therapy and occupational therapy  - Weight bearing as tolerated of the left lower extremity with assistance of a walker  - Incentive spirometry encouraged  - Pain control as clinically indicated  - Disposition: Home today if cleared by medicine and PT/OT       
Date/Time Patient Seen:  		  Referring MD:   Data Reviewed	       Patient is a 58y old  Male who presents with a chief complaint of Left Anterior Total Hip Arthroplasty  (27 Mar 2024 12:07)      Subjective/HPI     PAST MEDICAL & SURGICAL HISTORY:  Osteoarthritis of left hip    History of cellulitis    Hypertension    Hypercholesteremia    Sleep apnea    Class 2 obesity with body mass index (BMI) of 39.0 to 39.9 in adult    History of lumbar discectomy          Medication list         MEDICATIONS  (STANDING):  acetaminophen     Tablet .. 1000 milliGRAM(s) Oral every 8 hours  allopurinol 400 milliGRAM(s) Oral daily  aspirin enteric coated 81 milliGRAM(s) Oral every 12 hours  atorvastatin 10 milliGRAM(s) Oral at bedtime  celecoxib 200 milliGRAM(s) Oral every 12 hours  lactated ringers. 1000 milliLiter(s) (100 mL/Hr) IV Continuous <Continuous>  pantoprazole    Tablet 40 milliGRAM(s) Oral before breakfast  polyethylene glycol 3350 17 Gram(s) Oral at bedtime  senna 2 Tablet(s) Oral at bedtime    MEDICATIONS  (PRN):  HYDROmorphone  Injectable 0.5 milliGRAM(s) IV Push every 3 hours PRN breakthrough pain  magnesium hydroxide Suspension 30 milliLiter(s) Oral daily PRN Constipation  ondansetron Injectable 4 milliGRAM(s) IV Push every 6 hours PRN Nausea and/or Vomiting  oxyCODONE    IR 10 milliGRAM(s) Oral every 3 hours PRN Severe Pain (7 - 10)  oxyCODONE    IR 5 milliGRAM(s) Oral every 3 hours PRN Moderate Pain (4 - 6)         Vitals log        ICU Vital Signs Last 24 Hrs  T(C): 36.5 (28 Mar 2024 03:30), Max: 37.2 (27 Mar 2024 20:17)  T(F): 97.7 (28 Mar 2024 03:30), Max: 99 (27 Mar 2024 20:17)  HR: 73 (28 Mar 2024 03:30) (73 - 86)  BP: 113/65 (28 Mar 2024 03:30) (110/62 - 159/76)  BP(mean): --  ABP: --  ABP(mean): --  RR: 18 (28 Mar 2024 03:30) (13 - 22)  SpO2: 97% (28 Mar 2024 03:30) (93% - 100%)    O2 Parameters below as of 27 Mar 2024 20:17  Patient On (Oxygen Delivery Method): room air                 Input and Output:  I&O's Detail    27 Mar 2024 07:01  -  28 Mar 2024 05:35  --------------------------------------------------------  IN:    Lactated Ringers: 1550 mL    Sodium Chloride 0.9% Bolus: 500 mL  Total IN: 2050 mL    OUT:    Blood Loss (mL): 250 mL    Voided (mL): 500 mL  Total OUT: 750 mL    Total NET: 1300 mL          Lab Data                  Review of Systems	      Objective     Physical Examination  heart s1s2  lung dc BS  head nc        Pertinent Lab findings & Imaging      Migdalia:  NO   Adequate UO     I&O's Detail    27 Mar 2024 07:01  -  28 Mar 2024 05:35  --------------------------------------------------------  IN:    Lactated Ringers: 1550 mL    Sodium Chloride 0.9% Bolus: 500 mL  Total IN: 2050 mL    OUT:    Blood Loss (mL): 250 mL    Voided (mL): 500 mL  Total OUT: 750 mL    Total NET: 1300 mL               Discussed with:     Cultures:	        Radiology                            
Post Op Note- POD #0    SUBJECTIVE    57yo Male status post left THR.  Patients is alert and comfortable  Pain is well controlled. Rates pain a 0 on a scale of 0-10.  Denies chest pain/shortness of breath/nausea/vomiting.     OBJECTIVE    T(C): 36.7 (03-27-24 @ 12:35), Max: 36.7 (03-27-24 @ 12:35)  HR: 82 (03-27-24 @ 12:35) (75 - 86)  BP: 132/70 (03-27-24 @ 12:35) (112/60 - 159/76)  RR: 18 (03-27-24 @ 12:35) (13 - 22)  SpO2: 96% (03-27-24 @ 12:35) (96% - 100%)  Wt(kg): --      03-27 @ 07:01  -  03-27 @ 14:42  --------------------------------------------------------  IN: 2050 mL / OUT: 250 mL / NET: 1800 mL      PHYSICAL EXAM    Left hip primary dressing C/D/I  Sensation Intact to Light Touch distally  EHL/FHL intact  Toes warm and pink, capillary refill <2 sec.   Calves soft/nontender bilaterally       ASSESSMENT AND PLAN    - Orthopedically stable  - DVT prophylaxis: PAS + Aspirin 81 mg Q12h  - HO prophylaxis: Celebrex 200mg PO twice daily  - OOB as tolerated with PT/OT  - Hip dislocation precautions  - Pain control as clinically indicated  - Medicine to follow   - Continue antibiotics as per SCIP protocol  - Follow up Labs  - Incentive spirometry  - Discharge home tomorrow pending PT
Patient is a 58y old  Male who presents with a chief complaint of Left Anterior Total Hip Arthroplasty  (27 Mar 2024 12:07)      INTERVAL HPI/OVERNIGHT EVENTS: Patient seen and examined at bedside. No overnight events. Patient feels well.     MEDICATIONS  (STANDING):  acetaminophen     Tablet .. 1000 milliGRAM(s) Oral every 8 hours  allopurinol 400 milliGRAM(s) Oral daily  aspirin enteric coated 81 milliGRAM(s) Oral every 12 hours  atorvastatin 10 milliGRAM(s) Oral at bedtime  celecoxib 200 milliGRAM(s) Oral every 12 hours  lactated ringers. 1000 milliLiter(s) (100 mL/Hr) IV Continuous <Continuous>  pantoprazole    Tablet 40 milliGRAM(s) Oral before breakfast  polyethylene glycol 3350 17 Gram(s) Oral at bedtime  senna 2 Tablet(s) Oral at bedtime    MEDICATIONS  (PRN):  HYDROmorphone  Injectable 0.5 milliGRAM(s) IV Push every 3 hours PRN breakthrough pain  magnesium hydroxide Suspension 30 milliLiter(s) Oral daily PRN Constipation  ondansetron Injectable 4 milliGRAM(s) IV Push every 6 hours PRN Nausea and/or Vomiting  oxyCODONE    IR 5 milliGRAM(s) Oral every 3 hours PRN Moderate Pain (4 - 6)  oxyCODONE    IR 10 milliGRAM(s) Oral every 3 hours PRN Severe Pain (7 - 10)      Allergies    No Known Allergies    Intolerances    vancomycin (Other)      REVIEW OF SYSTEMS:  CONSTITUTIONAL: No fever or chills  HEENT:  No headache, no sore throat  RESPIRATORY: No cough, wheezing, or shortness of breath  CARDIOVASCULAR: No chest pain, palpitations, or leg swelling  GASTROINTESTINAL: No abd pain, nausea, vomiting, or diarrhea  GENITOURINARY: No dysuria, frequency, or hematuria  NEUROLOGICAL: no focal weakness or dizziness  MUSCULOSKELETAL: no myalgias     Vital Signs Last 24 Hrs  T(C): 36.6 (28 Mar 2024 07:53), Max: 37.2 (27 Mar 2024 20:17)  T(F): 97.8 (28 Mar 2024 07:53), Max: 99 (27 Mar 2024 20:17)  HR: 71 (28 Mar 2024 07:53) (71 - 84)  BP: 123/64 (28 Mar 2024 07:53) (110/62 - 141/70)  BP(mean): --  RR: 18 (28 Mar 2024 07:53) (14 - 21)  SpO2: 97% (28 Mar 2024 07:53) (93% - 100%)    Parameters below as of 28 Mar 2024 07:53  Patient On (Oxygen Delivery Method): room air        PHYSICAL EXAM:  GENERAL: NAD  HEENT:  NCAT, moist mucous membranes  CHEST/LUNG:  CTA b/l, no rales, wheezes, or rhonchi  HEART:  RRR, S1, S2  ABDOMEN:  BS+, soft, nontender, nondistended  EXTREMITIES: L hip dressing c/d/i,  no edema, cyanosis, or calf tenderness  NERVOUS SYSTEM: AA&Ox3, sensation intact    LABS:                        11.8   13.90 )-----------( 248      ( 28 Mar 2024 06:00 )             34.7     CBC Full  -  ( 28 Mar 2024 06:00 )  WBC Count : 13.90 K/uL  Hemoglobin : 11.8 g/dL  Hematocrit : 34.7 %  Platelet Count - Automated : 248 K/uL  Mean Cell Volume : 93.8 fl  Mean Cell Hemoglobin : 31.9 pg  Mean Cell Hemoglobin Concentration : 34.0 gm/dL  Auto Neutrophil # : x  Auto Lymphocyte # : x  Auto Monocyte # : x  Auto Eosinophil # : x  Auto Basophil # : x  Auto Neutrophil % : x  Auto Lymphocyte % : x  Auto Monocyte % : x  Auto Eosinophil % : x  Auto Basophil % : x    28 Mar 2024 06:00    137    |  102    |  15     ----------------------------<  125    3.9     |  25     |  0.96     Ca    8.6        28 Mar 2024 06:00        Urinalysis Basic - ( 28 Mar 2024 06:00 )    Color: x / Appearance: x / SG: x / pH: x  Gluc: 125 mg/dL / Ketone: x  / Bili: x / Urobili: x   Blood: x / Protein: x / Nitrite: x   Leuk Esterase: x / RBC: x / WBC x   Sq Epi: x / Non Sq Epi: x / Bacteria: x      CAPILLARY BLOOD GLUCOSE              RADIOLOGY & ADDITIONAL TESTS: b    Consultant(s) Notes Reviewed:  [x] YES  [ ] NO    Care Discussed with [x] Consultants  [x] Patient  [ ] Family  [ ]      [ x] Other; RN  DVT ppx

## 2024-04-08 RX ORDER — MELOXICAM 15 MG/1
15 TABLET ORAL
Qty: 15 | Refills: 0 | Status: ACTIVE | COMMUNITY
Start: 2024-04-08 | End: 1900-01-01

## 2024-04-12 ENCOUNTER — NON-APPOINTMENT (OUTPATIENT)
Age: 59
End: 2024-04-12

## 2024-04-12 ENCOUNTER — APPOINTMENT (OUTPATIENT)
Dept: ORTHOPEDIC SURGERY | Facility: CLINIC | Age: 59
End: 2024-04-12
Payer: COMMERCIAL

## 2024-04-12 VITALS
BODY MASS INDEX: 41.75 KG/M2 | HEIGHT: 73 IN | SYSTOLIC BLOOD PRESSURE: 148 MMHG | DIASTOLIC BLOOD PRESSURE: 81 MMHG | WEIGHT: 315 LBS | HEART RATE: 88 BPM

## 2024-04-12 DIAGNOSIS — Z96.642 PRESENCE OF LEFT ARTIFICIAL HIP JOINT: ICD-10-CM

## 2024-04-12 PROCEDURE — 99024 POSTOP FOLLOW-UP VISIT: CPT

## 2024-04-12 PROCEDURE — 73502 X-RAY EXAM HIP UNI 2-3 VIEWS: CPT | Mod: LT

## 2024-04-12 RX ORDER — AMOXICILLIN 500 MG/1
500 CAPSULE ORAL
Qty: 20 | Refills: 4 | Status: ACTIVE | COMMUNITY
Start: 2024-04-12 | End: 1900-01-01

## 2024-04-12 NOTE — END OF VISIT
[FreeTextEntry3] : I Dr Bauer  personally performed the evaluation and management services for this new/established  patient. This includes conducting the initial examination, assessing all conditions, and establishing the plan of care. Today, my ACP Conor Banuelos was here to observe my evaluation and management services for this patient to be followed going forward.  Naga Higgins M.D

## 2024-04-12 NOTE — HISTORY OF PRESENT ILLNESS
[de-identified] : S/P left total hip replacement. 3/27/2024. [de-identified] : The patient is a 58-year-old gentleman who presents today for follow-up of his left anterior total hip replacement on March 27, 2024.  He is here for surgical tape removal.  He is on no pain medication.  He is driving.  He is very happy with the results. [de-identified] : The surgical tape was removed Steri-Strips applied.  Incision is clean, dry, intact.  On the superior pole of the incision there is slight eschar.  Leg lengths appear equal on the table.  Range of motion acceptable for 2 weeks postop.  His calves are soft, nontender, no evidence of DVT at this time.  Patient is good motor and sensory to both legs. [de-identified] : Left total hip replacement, in anatomical alignment, excellent bone to prosthesis interface, there is no gross evidence of prosthetic failure, all the components are perfectly anatomically aligned. [de-identified] : Stable postoperative course of a left anterior total hip replacement [de-identified] : The patient will continue an exercise program of walking, strength training.  Patient will continue the meloxicam for 3 weeks for HO prevention.  He is allergic to Celebrex which is a new allergy.  The patient was given a prescription for amoxicillin for dental prophylaxis.  He will return in 6 weeks for range of motion check and a follow-up visit.  Patient was seen examined and evaluated by Dr. Calhoun today, Dr. Calhoun is guiding this patient's entire orthopedic and surgical management.

## 2024-05-12 ENCOUNTER — RX RENEWAL (OUTPATIENT)
Age: 59
End: 2024-05-12

## 2024-05-12 RX ORDER — ALLOPURINOL 300 MG/1
300 TABLET ORAL DAILY
Qty: 90 | Refills: 0 | Status: ACTIVE | COMMUNITY
Start: 2023-05-17 | End: 1900-01-01

## 2024-05-20 NOTE — DISCHARGE NOTE NURSING/CASE MANAGEMENT/SOCIAL WORK - NSSCTYPOFSERV_GEN_ALL_CORE
Assessment & Plan:  Eczema  Treatment plan  start  - mupirocin (BACTROBAN) 2 % ointment; Apply to the dorsal feet twice daily until Friday. Then use at night until clear.  Dispense: 22 g; Refill: 1  - triamcinolone (ARISTOCORT) 0.1 % ointment; Start Friday, Use to apply to the dorsal feet daily in the AM.  Dispense: 80 g; Refill: 0  SE of skin thinning and hypopigmentation cautioned     Use only fragrance-free products, including:  - Fragrance-free soap/ cleanser such as Dove UNSCENTED for sensitive skin.  - Fragrance-free detergent and dryer sheets such as All Free & Clear or Tide FREE; Bounce FREE.  - Fragrance-free moisturizing creams such as CeraVe cream, Cetaphil cream, Aveeno Healing ointment or Vaseline ointment.      Physical Therapist to visit the day after hospital discharge; Registered Nurse to follow if there is a need. Please contact the home care agency at the above phone number if you have not heard from them by 12 noon on the day after your hospital discharge

## 2024-05-31 ENCOUNTER — APPOINTMENT (OUTPATIENT)
Dept: ORTHOPEDIC SURGERY | Facility: CLINIC | Age: 59
End: 2024-05-31
Payer: COMMERCIAL

## 2024-05-31 VITALS — BODY MASS INDEX: 41.75 KG/M2 | HEIGHT: 73 IN | WEIGHT: 315 LBS

## 2024-05-31 PROCEDURE — 73502 X-RAY EXAM HIP UNI 2-3 VIEWS: CPT

## 2024-05-31 PROCEDURE — 99024 POSTOP FOLLOW-UP VISIT: CPT

## 2024-05-31 NOTE — HISTORY OF PRESENT ILLNESS
[Clean/Dry/Intact] : clean, dry and intact [Healed] : healed [Neuro Intact] : an unremarkable neurological exam [Vascular Intact] : ~T peripheral vascular exam normal [Negative Kristen's] : maneuvers demonstrated a negative Kristen's sign [Doing Well] : is doing well [Excellent Pain Control] : has excellent pain control [No Sign of Infection] : is showing no signs of infection [Chills] : no chills [Constipation] : no constipation [Diarrhea] : no diarrhea [Dysuria] : no dysuria [Fever] : no fever [Nausea] : no nausea [Vomiting] : no vomiting [Erythema] : not erythematous [Discharge] : absent of discharge [Swelling] : not swollen [Dehiscence] : not dehisced [de-identified] : S/P left total hip replacement. 3/27/2024. [de-identified] : Patient is a 58-year-old male who presents today for an evaluation of the left hip.  He is status post left total hip replacement on March 27, 2024.  Overall he states he is doing very well and extremely happy following his surgery.  He states that he is slowly returning back to his previous activities. [de-identified] : On physical examination of the left hip.  Patient is status post left total hip replacement.  There is a well-healed surgical scar noted anteriorly.  There is no redness, swelling, heat, ecchymosis, discharge, pain or tenderness on examination.  The patient has excellent range of motion, passively, actively and against resistance, in all planes.  There is no evidence of limb length discrepancy.  No evidence of muscle atrophy. No palpable defect. No evidence of any motor or sensory deficit.  Patient has good distal pulses with no calf tenderness. [de-identified] : X-rays of the left hip reveals a status post left total hip replacement. In both views, the pelvis and left hip. The hardware is in place and shows excellent alignment as well as fixation. There is no evidence of limb length discrepancies. There is no evidence of any fracture, dislocation, loosening of the prosthesis.  [de-identified] : Status post left total hip replacement on March 27, 2024 [de-identified] : The patient was assured that they are progressing well post operatively. They were explained that the prosthesis is in perfect alignment, perfect placement and fixated well.  It was explained that they are progressing well and as expected. It is recommended that the pt continue with the current treatment plan. This includes an ongoing exercise program, ice/moist heat when needed and NSAID's when needed. It was explained that a good exercise routine will help with pain relief and improve the overall longevity of the prosthesis. The patient is advised to return to the office in another year or so for further evaluation and x-rays. However, if there is any increase in pain, redness, swelling, heat, discharge, fever, change in ambulation or pain. The patient is strongly advised to call the office sooner.  I, Dr. Bauer, personally performed the evaluation and management services for this established patient who presents today with an orthopedic condition. The evaluation and management includes conducting the conditions and establishing a plan of care.  Today[facility] was here to assist with the patient in my evaluation and management services for this condition which will be followed going forward. 35 minutes were spent, face to face, in direct consultation with the patient. This includes reviewing the natural history of their Dx., eliciting the history, performing an orthopedic exam, review of the x-ray findings, forming a differential Dx and discussing all treatment options. This Includes both surgical and non-surgical treatments. I also reviewed all the risks and benefits of non-operative & operative Tx options, future impact into orthopedic functions/problems, activity restrictions both at home and at work, and all follow up requirements.

## 2024-11-19 ENCOUNTER — RX RENEWAL (OUTPATIENT)
Age: 59
End: 2024-11-19

## (undated) DEVICE — ELCTR AQUAMANTYS BIPOLAR SEALER 6.0

## (undated) DEVICE — HOOD FLYTE STRYKER HELMET SHIELD

## (undated) DEVICE — DRAPE XL SHEET 77X98"

## (undated) DEVICE — SUT VICRYL PLUS 1 27" CP UNDYED

## (undated) DEVICE — DRSG COBAN 6"

## (undated) DEVICE — DRAPE SUPINE ESYSUIT

## (undated) DEVICE — DRSG PICO NPWT 4X12"

## (undated) DEVICE — POSITIONER POSITIONING ROLL  5X17"

## (undated) DEVICE — SUT STRATAFIX SPIRAL MONOCRYL PLUS 3-0 30CM PS-2 UNDYED

## (undated) DEVICE — SUT QUILL PDO 0 36CM 36MM

## (undated) DEVICE — ELCTR ROCKER SWITCH PENCIL BLUE 10FT

## (undated) DEVICE — SOL IRR POUR NS 0.9% 500ML

## (undated) DEVICE — NDL SPINAL 18G X 3.5" (PINK)

## (undated) DEVICE — PACK TOTAL HIP

## (undated) DEVICE — SUT POLYSORB 3-0 27" GS-11 UNDYED

## (undated) DEVICE — WOUND IRR IRRISEPT W 0.5 CHG

## (undated) DEVICE — DRAPE MAYO STAND 30"

## (undated) DEVICE — WARMING BLANKET UPPER ADULT

## (undated) DEVICE — SUT STRATAFIX SPIRAL MONOCRYL PLUS 4-0 14CM PS-2 UNDYED

## (undated) DEVICE — SYR LUER LOK 50CC

## (undated) DEVICE — SAW BLADE STRYKER SAGITTAL AGGRESSIVE 25X86.5X1.32MM

## (undated) DEVICE — VENODYNE/SCD SLEEVE CALF MEDIUM

## (undated) DEVICE — DRAPE C ARM 41X140"

## (undated) DEVICE — DRSG DERMABOND PRINEO 60CM

## (undated) DEVICE — DRSG STOCKINETTE TUBULAR COTTON 2PLY 6X72"

## (undated) DEVICE — SOL IRR POUR H2O 1500ML

## (undated) DEVICE — SOL IRR BAG NS 0.9% 3000ML

## (undated) DEVICE — HANDPIECE INTERPULSE W MULTI TIP